# Patient Record
Sex: MALE | Race: BLACK OR AFRICAN AMERICAN | NOT HISPANIC OR LATINO | Employment: FULL TIME | URBAN - METROPOLITAN AREA
[De-identification: names, ages, dates, MRNs, and addresses within clinical notes are randomized per-mention and may not be internally consistent; named-entity substitution may affect disease eponyms.]

---

## 2019-03-05 ENCOUNTER — HOSPITAL ENCOUNTER (OUTPATIENT)
Facility: HOSPITAL | Age: 44
Discharge: LEFT BEFORE DISCHARGE | End: 2019-03-06
Attending: EMERGENCY MEDICINE | Admitting: INTERNAL MEDICINE
Payer: MEDICAID

## 2019-03-05 DIAGNOSIS — T78.3XXA ANGIOEDEMA, INITIAL ENCOUNTER: Primary | ICD-10-CM

## 2019-03-05 DIAGNOSIS — I10 HYPERTENSION, UNSPECIFIED TYPE: ICD-10-CM

## 2019-03-05 DIAGNOSIS — N28.9 RENAL INSUFFICIENCY: ICD-10-CM

## 2019-03-05 DIAGNOSIS — Z72.0 TOBACCO ABUSE: ICD-10-CM

## 2019-03-05 LAB
ALBUMIN SERPL BCP-MCNC: 4 G/DL
ALP SERPL-CCNC: 62 U/L
ALT SERPL W/O P-5'-P-CCNC: 17 U/L
ANION GAP SERPL CALC-SCNC: 11 MMOL/L
APTT BLDCRRT: 31.3 SEC
AST SERPL-CCNC: 22 U/L
BASOPHILS # BLD AUTO: 0.05 K/UL
BASOPHILS NFR BLD: 0.7 %
BILIRUB SERPL-MCNC: 0.3 MG/DL
BUN SERPL-MCNC: 23 MG/DL
CALCIUM SERPL-MCNC: 9.9 MG/DL
CHLORIDE SERPL-SCNC: 105 MMOL/L
CO2 SERPL-SCNC: 24 MMOL/L
CREAT SERPL-MCNC: 2.3 MG/DL
DIFFERENTIAL METHOD: NORMAL
EOSINOPHIL # BLD AUTO: 0.1 K/UL
EOSINOPHIL NFR BLD: 1.8 %
ERYTHROCYTE [DISTWIDTH] IN BLOOD BY AUTOMATED COUNT: 14.2 %
EST. GFR  (AFRICAN AMERICAN): 38 ML/MIN/1.73 M^2
EST. GFR  (NON AFRICAN AMERICAN): 33 ML/MIN/1.73 M^2
GLUCOSE SERPL-MCNC: 76 MG/DL
HCT VFR BLD AUTO: 45.5 %
HGB BLD-MCNC: 14.8 G/DL
INR PPP: 1
LYMPHOCYTES # BLD AUTO: 2 K/UL
LYMPHOCYTES NFR BLD: 27.3 %
MCH RBC QN AUTO: 29.8 PG
MCHC RBC AUTO-ENTMCNC: 32.5 G/DL
MCV RBC AUTO: 92 FL
MONOCYTES # BLD AUTO: 0.7 K/UL
MONOCYTES NFR BLD: 8.9 %
NEUTROPHILS # BLD AUTO: 4.5 K/UL
NEUTROPHILS NFR BLD: 61.3 %
PLATELET # BLD AUTO: 252 K/UL
PMV BLD AUTO: 11 FL
POTASSIUM SERPL-SCNC: 3.9 MMOL/L
PROT SERPL-MCNC: 7.5 G/DL
PROTHROMBIN TIME: 10.4 SEC
RBC # BLD AUTO: 4.97 M/UL
SODIUM SERPL-SCNC: 140 MMOL/L
WBC # BLD AUTO: 7.33 K/UL

## 2019-03-05 PROCEDURE — 25000003 PHARM REV CODE 250: Performed by: EMERGENCY MEDICINE

## 2019-03-05 PROCEDURE — 96374 THER/PROPH/DIAG INJ IV PUSH: CPT

## 2019-03-05 PROCEDURE — G0378 HOSPITAL OBSERVATION PER HR: HCPCS

## 2019-03-05 PROCEDURE — 85610 PROTHROMBIN TIME: CPT

## 2019-03-05 PROCEDURE — 99285 EMERGENCY DEPT VISIT HI MDM: CPT | Mod: 25

## 2019-03-05 PROCEDURE — 81003 URINALYSIS AUTO W/O SCOPE: CPT

## 2019-03-05 PROCEDURE — 85025 COMPLETE CBC W/AUTO DIFF WBC: CPT

## 2019-03-05 PROCEDURE — 25000003 PHARM REV CODE 250: Performed by: NURSE PRACTITIONER

## 2019-03-05 PROCEDURE — 63600175 PHARM REV CODE 636 W HCPCS: Performed by: NURSE PRACTITIONER

## 2019-03-05 PROCEDURE — 63600175 PHARM REV CODE 636 W HCPCS: Performed by: EMERGENCY MEDICINE

## 2019-03-05 PROCEDURE — 96375 TX/PRO/DX INJ NEW DRUG ADDON: CPT

## 2019-03-05 PROCEDURE — 85730 THROMBOPLASTIN TIME PARTIAL: CPT

## 2019-03-05 PROCEDURE — S0028 INJECTION, FAMOTIDINE, 20 MG: HCPCS | Performed by: NURSE PRACTITIONER

## 2019-03-05 PROCEDURE — 80053 COMPREHEN METABOLIC PANEL: CPT

## 2019-03-05 RX ORDER — METHYLPREDNISOLONE SOD SUCC 125 MG
125 VIAL (EA) INJECTION
Status: COMPLETED | OUTPATIENT
Start: 2019-03-05 | End: 2019-03-05

## 2019-03-05 RX ORDER — DEXAMETHASONE SODIUM PHOSPHATE 4 MG/ML
12 INJECTION, SOLUTION INTRA-ARTICULAR; INTRALESIONAL; INTRAMUSCULAR; INTRAVENOUS; SOFT TISSUE EVERY 12 HOURS
Status: DISCONTINUED | OUTPATIENT
Start: 2019-03-06 | End: 2019-03-06

## 2019-03-05 RX ORDER — LISINOPRIL 40 MG/1
40 TABLET ORAL DAILY
Status: ON HOLD | COMMUNITY
End: 2019-03-06 | Stop reason: HOSPADM

## 2019-03-05 RX ORDER — FAMOTIDINE 10 MG/ML
20 INJECTION INTRAVENOUS DAILY
Status: DISCONTINUED | OUTPATIENT
Start: 2019-03-06 | End: 2019-03-07 | Stop reason: HOSPADM

## 2019-03-05 RX ORDER — CLONIDINE HYDROCHLORIDE 0.1 MG/1
0.1 TABLET ORAL EVERY 6 HOURS PRN
Status: DISCONTINUED | OUTPATIENT
Start: 2019-03-05 | End: 2019-03-05

## 2019-03-05 RX ORDER — DIPHENHYDRAMINE HYDROCHLORIDE 50 MG/ML
50 INJECTION INTRAMUSCULAR; INTRAVENOUS
Status: COMPLETED | OUTPATIENT
Start: 2019-03-05 | End: 2019-03-05

## 2019-03-05 RX ORDER — DIPHENHYDRAMINE HYDROCHLORIDE 50 MG/ML
25 INJECTION INTRAMUSCULAR; INTRAVENOUS
Status: DISCONTINUED | OUTPATIENT
Start: 2019-03-05 | End: 2019-03-05

## 2019-03-05 RX ORDER — DIPHENHYDRAMINE HYDROCHLORIDE 50 MG/ML
50 INJECTION INTRAMUSCULAR; INTRAVENOUS EVERY 6 HOURS
Status: DISCONTINUED | OUTPATIENT
Start: 2019-03-06 | End: 2019-03-07 | Stop reason: HOSPADM

## 2019-03-05 RX ORDER — VERAPAMIL HYDROCHLORIDE 40 MG/1
40 TABLET ORAL 3 TIMES DAILY
Status: DISCONTINUED | OUTPATIENT
Start: 2019-03-06 | End: 2019-03-06

## 2019-03-05 RX ORDER — DEXAMETHASONE SODIUM PHOSPHATE 4 MG/ML
12 INJECTION, SOLUTION INTRA-ARTICULAR; INTRALESIONAL; INTRAMUSCULAR; INTRAVENOUS; SOFT TISSUE
Status: COMPLETED | OUTPATIENT
Start: 2019-03-05 | End: 2019-03-05

## 2019-03-05 RX ORDER — SODIUM CHLORIDE 0.9 % (FLUSH) 0.9 %
5 SYRINGE (ML) INJECTION
Status: DISCONTINUED | OUTPATIENT
Start: 2019-03-05 | End: 2019-03-07 | Stop reason: HOSPADM

## 2019-03-05 RX ORDER — HYDRALAZINE HYDROCHLORIDE 20 MG/ML
10 INJECTION INTRAMUSCULAR; INTRAVENOUS EVERY 8 HOURS PRN
Status: DISCONTINUED | OUTPATIENT
Start: 2019-03-05 | End: 2019-03-07 | Stop reason: HOSPADM

## 2019-03-05 RX ORDER — FAMOTIDINE 10 MG/ML
20 INJECTION INTRAVENOUS
Status: COMPLETED | OUTPATIENT
Start: 2019-03-05 | End: 2019-03-05

## 2019-03-05 RX ORDER — FAMOTIDINE 10 MG/ML
20 INJECTION INTRAVENOUS 2 TIMES DAILY
Status: DISCONTINUED | OUTPATIENT
Start: 2019-03-06 | End: 2019-03-05

## 2019-03-05 RX ORDER — ACETAMINOPHEN 500 MG
500 TABLET ORAL EVERY 6 HOURS PRN
Status: DISCONTINUED | OUTPATIENT
Start: 2019-03-05 | End: 2019-03-07 | Stop reason: HOSPADM

## 2019-03-05 RX ORDER — HYDRALAZINE HYDROCHLORIDE 20 MG/ML
10 INJECTION INTRAMUSCULAR; INTRAVENOUS
Status: COMPLETED | OUTPATIENT
Start: 2019-03-05 | End: 2019-03-05

## 2019-03-05 RX ADMIN — SODIUM CHLORIDE 1000 ML: 0.9 INJECTION, SOLUTION INTRAVENOUS at 10:03

## 2019-03-05 RX ADMIN — DEXAMETHASONE SODIUM PHOSPHATE 12 MG: 4 INJECTION, SOLUTION INTRAMUSCULAR; INTRAVENOUS at 08:03

## 2019-03-05 RX ADMIN — DIPHENHYDRAMINE HYDROCHLORIDE 50 MG: 50 INJECTION INTRAMUSCULAR; INTRAVENOUS at 08:03

## 2019-03-05 RX ADMIN — HYDRALAZINE HYDROCHLORIDE 10 MG: 20 INJECTION INTRAMUSCULAR; INTRAVENOUS at 08:03

## 2019-03-05 RX ADMIN — FAMOTIDINE 20 MG: 10 INJECTION INTRAVENOUS at 08:03

## 2019-03-05 RX ADMIN — METHYLPREDNISOLONE SODIUM SUCCINATE 125 MG: 125 INJECTION, POWDER, FOR SOLUTION INTRAMUSCULAR; INTRAVENOUS at 08:03

## 2019-03-06 VITALS
HEIGHT: 75 IN | RESPIRATION RATE: 18 BRPM | OXYGEN SATURATION: 98 % | SYSTOLIC BLOOD PRESSURE: 188 MMHG | BODY MASS INDEX: 25.08 KG/M2 | TEMPERATURE: 98 F | DIASTOLIC BLOOD PRESSURE: 121 MMHG | HEART RATE: 82 BPM | WEIGHT: 201.75 LBS

## 2019-03-06 LAB
BILIRUB UR QL STRIP: NEGATIVE
CLARITY UR: CLEAR
COLOR UR: COLORLESS
GLUCOSE UR QL STRIP: NEGATIVE
HGB UR QL STRIP: NEGATIVE
KETONES UR QL STRIP: NEGATIVE
LEUKOCYTE ESTERASE UR QL STRIP: NEGATIVE
NITRITE UR QL STRIP: NEGATIVE
PH UR STRIP: 6 [PH] (ref 5–8)
PROT UR QL STRIP: NEGATIVE
SP GR UR STRIP: 1 (ref 1–1.03)
URN SPEC COLLECT METH UR: ABNORMAL
UROBILINOGEN UR STRIP-ACNC: NEGATIVE EU/DL

## 2019-03-06 PROCEDURE — S0028 INJECTION, FAMOTIDINE, 20 MG: HCPCS | Performed by: PHYSICIAN ASSISTANT

## 2019-03-06 PROCEDURE — 63600175 PHARM REV CODE 636 W HCPCS: Performed by: PHYSICIAN ASSISTANT

## 2019-03-06 PROCEDURE — G0378 HOSPITAL OBSERVATION PER HR: HCPCS

## 2019-03-06 PROCEDURE — 25000003 PHARM REV CODE 250: Performed by: PHYSICIAN ASSISTANT

## 2019-03-06 PROCEDURE — 63600175 PHARM REV CODE 636 W HCPCS: Performed by: EMERGENCY MEDICINE

## 2019-03-06 PROCEDURE — 96376 TX/PRO/DX INJ SAME DRUG ADON: CPT | Performed by: EMERGENCY MEDICINE

## 2019-03-06 PROCEDURE — 25000003 PHARM REV CODE 250: Performed by: NURSE PRACTITIONER

## 2019-03-06 PROCEDURE — 63600175 PHARM REV CODE 636 W HCPCS: Performed by: NURSE PRACTITIONER

## 2019-03-06 RX ORDER — CLONIDINE HYDROCHLORIDE 0.1 MG/1
0.2 TABLET ORAL ONCE
Status: COMPLETED | OUTPATIENT
Start: 2019-03-06 | End: 2019-03-06

## 2019-03-06 RX ORDER — VERAPAMIL HYDROCHLORIDE 120 MG/1
120 TABLET, FILM COATED, EXTENDED RELEASE ORAL DAILY
Status: DISCONTINUED | OUTPATIENT
Start: 2019-03-06 | End: 2019-03-06

## 2019-03-06 RX ORDER — HYDRALAZINE HYDROCHLORIDE 20 MG/ML
10 INJECTION INTRAMUSCULAR; INTRAVENOUS ONCE
Status: COMPLETED | OUTPATIENT
Start: 2019-03-06 | End: 2019-03-06

## 2019-03-06 RX ORDER — VERAPAMIL HYDROCHLORIDE 120 MG/1
120 TABLET, FILM COATED, EXTENDED RELEASE ORAL DAILY
Qty: 30 TABLET | Refills: 3 | Status: ON HOLD | OUTPATIENT
Start: 2019-03-07 | End: 2019-11-29 | Stop reason: HOSPADM

## 2019-03-06 RX ORDER — HYDRALAZINE HYDROCHLORIDE 25 MG/1
50 TABLET, FILM COATED ORAL EVERY 8 HOURS
Status: DISCONTINUED | OUTPATIENT
Start: 2019-03-06 | End: 2019-03-07 | Stop reason: HOSPADM

## 2019-03-06 RX ORDER — CETIRIZINE HYDROCHLORIDE 10 MG/1
10 TABLET ORAL DAILY
Refills: 0 | Status: ON HOLD | COMMUNITY
Start: 2019-03-06 | End: 2019-11-29 | Stop reason: HOSPADM

## 2019-03-06 RX ORDER — HYDRALAZINE HYDROCHLORIDE 25 MG/1
25 TABLET, FILM COATED ORAL EVERY 8 HOURS
Status: DISCONTINUED | OUTPATIENT
Start: 2019-03-06 | End: 2019-03-06

## 2019-03-06 RX ORDER — HYDRALAZINE HYDROCHLORIDE 50 MG/1
50 TABLET, FILM COATED ORAL 3 TIMES DAILY
Qty: 90 TABLET | Refills: 1 | Status: ON HOLD | OUTPATIENT
Start: 2019-03-06 | End: 2019-11-29 | Stop reason: HOSPADM

## 2019-03-06 RX ORDER — HYDRALAZINE HYDROCHLORIDE 25 MG/1
25 TABLET, FILM COATED ORAL EVERY 12 HOURS
Status: DISCONTINUED | OUTPATIENT
Start: 2019-03-06 | End: 2019-03-06

## 2019-03-06 RX ORDER — VERAPAMIL HYDROCHLORIDE 120 MG/1
120 TABLET, FILM COATED, EXTENDED RELEASE ORAL DAILY
Status: DISCONTINUED | OUTPATIENT
Start: 2019-03-06 | End: 2019-03-07 | Stop reason: HOSPADM

## 2019-03-06 RX ORDER — LABETALOL HYDROCHLORIDE 5 MG/ML
20 INJECTION, SOLUTION INTRAVENOUS ONCE
Status: DISCONTINUED | OUTPATIENT
Start: 2019-03-06 | End: 2019-03-06

## 2019-03-06 RX ORDER — PREDNISONE 10 MG/1
TABLET ORAL
Qty: 14 TABLET | Refills: 0 | Status: SHIPPED | OUTPATIENT
Start: 2019-03-06 | End: 2019-03-26

## 2019-03-06 RX ADMIN — DIPHENHYDRAMINE HYDROCHLORIDE 50 MG: 50 INJECTION, SOLUTION INTRAMUSCULAR; INTRAVENOUS at 06:03

## 2019-03-06 RX ADMIN — ACETAMINOPHEN 500 MG: 500 TABLET, FILM COATED ORAL at 12:03

## 2019-03-06 RX ADMIN — CLONIDINE HYDROCHLORIDE 0.2 MG: 0.1 TABLET ORAL at 02:03

## 2019-03-06 RX ADMIN — HYDRALAZINE HYDROCHLORIDE 10 MG: 20 INJECTION INTRAMUSCULAR; INTRAVENOUS at 12:03

## 2019-03-06 RX ADMIN — FAMOTIDINE 20 MG: 10 INJECTION INTRAVENOUS at 09:03

## 2019-03-06 RX ADMIN — CLONIDINE HYDROCHLORIDE 0.2 MG: 0.1 TABLET ORAL at 04:03

## 2019-03-06 RX ADMIN — VERAPAMIL HYDROCHLORIDE 120 MG: 120 TABLET, FILM COATED, EXTENDED RELEASE ORAL at 09:03

## 2019-03-06 RX ADMIN — HYDRALAZINE HYDROCHLORIDE 10 MG: 20 INJECTION INTRAMUSCULAR; INTRAVENOUS at 04:03

## 2019-03-06 RX ADMIN — DIPHENHYDRAMINE HYDROCHLORIDE 50 MG: 50 INJECTION, SOLUTION INTRAMUSCULAR; INTRAVENOUS at 01:03

## 2019-03-06 RX ADMIN — DEXAMETHASONE SODIUM PHOSPHATE 12 MG: 4 INJECTION, SOLUTION INTRAMUSCULAR; INTRAVENOUS at 06:03

## 2019-03-06 RX ADMIN — HYDRALAZINE HYDROCHLORIDE 10 MG: 20 INJECTION INTRAMUSCULAR; INTRAVENOUS at 06:03

## 2019-03-06 NOTE — HPI
Timo Youssef 43 y.o. male with HTN and renal insuffeciency presents to the ED with a chief complaint of lower lip swelling. He reports today at 4:30pm he began to experience lower lip swelling. He had just finished eating a Filet of fish sandwhich from [employer]. He has eaten shellfish before in the past without issue. He has been taking lisinopril for 5 years, but his dose was recently adjusted. He reports he was also bumped in the mouth while [place] today. He denies any bleeding from the event and reports minimal swelling at that time. He endorses lip swelling. He denies chest pain, fever, SOB, drooling, difficulty speaking, N/V/D, abdominal pain, leg swelling, wheezing and syncope. He reports he is a current 1/2 ppd smoker, occasionally uses alcohol, and denies recreational drug use.    His fiance reports his edema has not worsened, and has remained approximately the same since it started. He has never had difficulty speaking, nor wheezing, drooling, or SOB.     In the ED, Cr of 2.2, lower lip swelling, H&H 14.8&45.4, no WBC.

## 2019-03-06 NOTE — PROGRESS NOTES
[facility] Hospital Medicine  Progress Note    Patient Name: Timo Youssef  MRN: [id number]  Patient Class: OP- Observation   Admission Date: 3/5/2019  Length of Stay: 0 days  Attending Physician: Margie Soto MD  Primary Care Provider: Primary Doctor No        Subjective:     Principal Problem:Hypertension    HPI:  Timo Youssef 43 y.o. male with HTN and renal insuffeciency presents to the ED with a chief complaint of lower lip swelling. He reports today at 4:30pm he began to experience lower lip swelling. He had just finished eating a Filet of fish [employer] from [employer]. He has eaten shellfish before in the past without issue. He has been taking lisinopril for 5 years, but his dose was recently adjusted. He reports he was also bumped in the mouth while [place] today. He denies any bleeding from the event and reports minimal swelling at that time. He endorses lip swelling. He denies chest pain, fever, SOB, drooling, difficulty speaking, N/V/D, abdominal pain, leg swelling, wheezing and syncope. He reports he is a current 1/2 ppd smoker, occasionally uses alcohol, and denies recreational drug use.    His fiance reports his edema has not worsened, and has remained approximately the same since it started. He has never had difficulty speaking, nor wheezing, drooling, or SOB.     In the ED, Cr of 2.2, lower lip swelling, H&H 14.8&45.4, no WBC.     Hospital Course:  Timo Youssef 43 y.o. male admitted to observation for management of angio-edema due to lisinopril. Patient also noted to have uncontrolled blood pressure and CKD due to non adherence to medications. UA no proteinuria and does not see a Nephrologist. Low lip edema significantly improved overnight with steroid, H1, and H2. Patient tolerated regular diet without issues. Verapamil 40 bid switch to varapamil  mg daily and hydralazine 25 mg TID added.  Encourage smoking cessation, low salt diet and  compliance with medications.  NO ACEI or ARBS.       Interval History: Low lip swelling improved. Uncontrolled BP -190's.     Review of Systems   Constitutional: Negative for chills and fever.   HENT: Negative for drooling, nosebleeds, tinnitus, trouble swallowing and voice change.         Lip swelling   Eyes: Negative for photophobia and visual disturbance.   Respiratory: Negative for shortness of breath, wheezing and stridor.    Cardiovascular: Negative for chest pain, palpitations and leg swelling.   Gastrointestinal: Negative for abdominal distention, nausea and vomiting.   Genitourinary: Negative for dysuria, flank pain and hematuria.   Musculoskeletal: Negative for gait problem and joint swelling.   Skin: Negative for rash and wound.   Neurological: Negative for seizures, syncope and speech difficulty.     Objective:     Vital Signs (Most Recent):  Temp: 98.9 °F (37.2 °C) (03/06/19 1601)  Pulse: 94 (03/06/19 1601)  Resp: 18 (03/06/19 1601)  BP: (!) 189/118 (03/06/19 1601)  SpO2: 98 % (03/06/19 1601) Vital Signs (24h Range):  Temp:  [98.1 °F (36.7 °C)-99.1 °F (37.3 °C)] 98.9 °F (37.2 °C)  Pulse:  [72-97] 94  Resp:  [18] 18  SpO2:  [94 %-100 %] 98 %  BP: (152-194)/() 189/118     Weight: 91.5 kg (201 lb 11.5 oz)  Body mass index is 25.55 kg/m².  No intake or output data in the 24 hours ending 03/06/19 1615   Physical Exam   Constitutional: He is oriented to person, place, and time. He appears well-developed and well-nourished. No distress.   HENT:   Head: Normocephalic and atraumatic.   Right Ear: External ear normal.   Left Ear: External ear normal.   Lower lip swelling significant improvement close to baseline. No involvement of tongue or posterior oropharynx. No drooling or stridor. Patient protecting airway and speaking in full sentences   Eyes: Conjunctivae and EOM are normal. Right eye exhibits no discharge. Left eye exhibits no discharge.   Neck: Normal range of motion. No thyromegaly present.    Cardiovascular: Normal rate and regular rhythm.   No murmur heard.  Pulmonary/Chest: Effort normal and breath sounds normal. No respiratory distress.   Abdominal: Soft. Bowel sounds are normal. He exhibits no distension and no mass. There is no tenderness.   Musculoskeletal: He exhibits no edema or deformity.   Neurological: He is alert and oriented to person, place, and time.   Skin: Skin is warm and dry.   Psychiatric: He has a normal mood and affect. His behavior is normal.   Nursing note and vitals reviewed.      Significant Labs: All pertinent labs within the past 24 hours have been reviewed.    Significant Imaging: I have reviewed and interpreted all pertinent imaging results/findings within the past 24 hours.    Assessment/Plan:      * Hypertension    Poorly controlled. Switch to verapamil 120 mg daily and add hydralazine 25 mg TID.    Prn hydralazine IV.   BP remains elevated despite hydralazine IV and clonidine 0.2 mg x 1. Give labetolol 20 mg IV x 1 now and reassess in 1 hr. Titrate oral hydralazine up accordingly.         Tobacco abuse    Greater than 3 minutes spent counseling patient on dangers of continued tobacco abuse. Will provide tobacco cessation education.     Renal insufficiency    -Appears to be baseline. Per care everywhere chart review Cr of 2.25 in 5/2016. Cr today of 2.3. Will hold lisinopril given likely cause of agnioedema. Renal dose medications. Avoid nephrotoxic drugs.  -UA no proteinuria   -PCP to refer to Nephrology outpatient     Angio-edema    Resolved. Lisnopril listed as allergy. Continue prednisone taper and zyrtec x 6 days.        VTE Risk Mitigation (From admission, onward)        Ordered     Place HARSHIL hose  Until discontinued      03/05/19 2148     IP VTE LOW RISK PATIENT  Once      03/05/19 2146     Place sequential compression device  Until discontinued      03/05/19 2146              Peyton Sorenson NP  Department of Hospital Medicine   [facility]

## 2019-03-06 NOTE — SUBJECTIVE & OBJECTIVE
Past Medical History:   Diagnosis Date    Hypertension        Past Surgical History:   Procedure Laterality Date    BRAIN SURGERY         Review of patient's allergies indicates:  No Known Allergies    No current facility-administered medications on file prior to encounter.      Current Outpatient Medications on File Prior to Encounter   Medication Sig    lisinopril (PRINIVIL,ZESTRIL) 40 MG tablet Take 40 mg by mouth once daily.     Family History     None        Tobacco Use    Smoking status: Current Every Day Smoker     Types: Cigars   Substance and Sexual Activity    Alcohol use: Yes    Drug use: No    Sexual activity: Not on file     Review of Systems   Constitutional: Negative for chills and fever.   HENT: Negative for drooling, nosebleeds, tinnitus, trouble swallowing and voice change.         Lip swelling   Eyes: Negative for photophobia and visual disturbance.   Respiratory: Negative for shortness of breath, wheezing and stridor.    Cardiovascular: Negative for chest pain, palpitations and leg swelling.   Gastrointestinal: Negative for abdominal distention, nausea and vomiting.   Genitourinary: Negative for dysuria, flank pain and hematuria.   Musculoskeletal: Negative for gait problem and joint swelling.   Skin: Negative for rash and wound.   Neurological: Negative for seizures, syncope and speech difficulty.     Objective:     Vital Signs (Most Recent):  Temp: 98.7 °F (37.1 °C) (03/05/19 1934)  Pulse: 76 (03/05/19 2131)  Resp: 18 (03/05/19 1934)  BP: (!) 166/103 (03/05/19 2131)  SpO2: 100 % (03/05/19 2131) Vital Signs (24h Range):  Temp:  [98.7 °F (37.1 °C)] 98.7 °F (37.1 °C)  Pulse:  [72-87] 76  Resp:  [18] 18  SpO2:  [98 %-100 %] 100 %  BP: (166-193)/(103-125) 166/103     Weight: 99.8 kg (220 lb)  Body mass index is 27.87 kg/m².    Physical Exam   Constitutional: He is oriented to person, place, and time. He appears well-developed and well-nourished. No distress.   HENT:   Head: Normocephalic and  atraumatic.   Right Ear: External ear normal.   Left Ear: External ear normal.   Lower lip swelling. No involvement of tongue or posterior oropharynx. No drooling or stridor. Patient protecting airway and speaking in full sentences   Eyes: Conjunctivae and EOM are normal. Right eye exhibits no discharge. Left eye exhibits no discharge.   Neck: Normal range of motion. No thyromegaly present.   Cardiovascular: Normal rate and regular rhythm.   No murmur heard.  Pulmonary/Chest: Effort normal and breath sounds normal. No respiratory distress.   Abdominal: Soft. Bowel sounds are normal. He exhibits no distension and no mass. There is no tenderness.   Musculoskeletal: He exhibits no edema or deformity.   Neurological: He is alert and oriented to person, place, and time.   Skin: Skin is warm and dry.   Psychiatric: He has a normal mood and affect. His behavior is normal.   Nursing note and vitals reviewed.        CRANIAL NERVES     CN III, IV, VI   Extraocular motions are normal.        Significant Labs:   CBC:   Recent Labs   Lab 03/05/19 1940   WBC 7.33   HGB 14.8   HCT 45.5        CMP:   Recent Labs   Lab 03/05/19 1940      K 3.9      CO2 24   GLU 76   BUN 23*   CREATININE 2.3*   CALCIUM 9.9   PROT 7.5   ALBUMIN 4.0   BILITOT 0.3   ALKPHOS 62   AST 22   ALT 17   ANIONGAP 11   EGFRNONAA 33*     Coagulation:   Recent Labs   Lab 03/05/19 1940   INR 1.0   APTT 31.3       Significant Imaging: I have reviewed all pertinent imaging results/findings within the past 24 hours.

## 2019-03-06 NOTE — ASSESSMENT & PLAN NOTE
Greater than 3 minutes spent counseling patient on dangers of continued tobacco abuse. Will provide tobacco cessation education.

## 2019-03-06 NOTE — SUBJECTIVE & OBJECTIVE
Interval History: Low lip swelling improved. Uncontrolled BP -190's.     Review of Systems   Constitutional: Negative for chills and fever.   HENT: Negative for drooling, nosebleeds, tinnitus, trouble swallowing and voice change.         Lip swelling   Eyes: Negative for photophobia and visual disturbance.   Respiratory: Negative for shortness of breath, wheezing and stridor.    Cardiovascular: Negative for chest pain, palpitations and leg swelling.   Gastrointestinal: Negative for abdominal distention, nausea and vomiting.   Genitourinary: Negative for dysuria, flank pain and hematuria.   Musculoskeletal: Negative for gait problem and joint swelling.   Skin: Negative for rash and wound.   Neurological: Negative for seizures, syncope and speech difficulty.     Objective:     Vital Signs (Most Recent):  Temp: 98.9 °F (37.2 °C) (03/06/19 1601)  Pulse: 94 (03/06/19 1601)  Resp: 18 (03/06/19 1601)  BP: (!) 189/118 (03/06/19 1601)  SpO2: 98 % (03/06/19 1601) Vital Signs (24h Range):  Temp:  [98.1 °F (36.7 °C)-99.1 °F (37.3 °C)] 98.9 °F (37.2 °C)  Pulse:  [72-97] 94  Resp:  [18] 18  SpO2:  [94 %-100 %] 98 %  BP: (152-194)/() 189/118     Weight: 91.5 kg (201 lb 11.5 oz)  Body mass index is 25.55 kg/m².  No intake or output data in the 24 hours ending 03/06/19 1615   Physical Exam   Constitutional: He is oriented to person, place, and time. He appears well-developed and well-nourished. No distress.   HENT:   Head: Normocephalic and atraumatic.   Right Ear: External ear normal.   Left Ear: External ear normal.   Lower lip swelling significant improvement close to baseline. No involvement of tongue or posterior oropharynx. No drooling or stridor. Patient protecting airway and speaking in full sentences   Eyes: Conjunctivae and EOM are normal. Right eye exhibits no discharge. Left eye exhibits no discharge.   Neck: Normal range of motion. No thyromegaly present.   Cardiovascular: Normal rate and regular rhythm.    No murmur heard.  Pulmonary/Chest: Effort normal and breath sounds normal. No respiratory distress.   Abdominal: Soft. Bowel sounds are normal. He exhibits no distension and no mass. There is no tenderness.   Musculoskeletal: He exhibits no edema or deformity.   Neurological: He is alert and oriented to person, place, and time.   Skin: Skin is warm and dry.   Psychiatric: He has a normal mood and affect. His behavior is normal.   Nursing note and vitals reviewed.      Significant Labs: All pertinent labs within the past 24 hours have been reviewed.    Significant Imaging: I have reviewed and interpreted all pertinent imaging results/findings within the past 24 hours.

## 2019-03-06 NOTE — PLAN OF CARE
Problem: Fall Injury Risk  Goal: Absence of Fall and Fall-Related Injury    Intervention: Identify and Manage Contributors to Fall Injury Risk   03/06/19 0136   Manage Acute Allergic Reaction   Medication Review/Management medications reviewed;high risk medications identified   Identify and Manage Contributors to Fall Injury Risk   Self-Care Promotion independence encouraged     Intervention: Promote Injury-Free Environment   03/06/19 0136   Optimize [place] and Functional Mobility   Environmental Safety Modification clutter free environment maintained;lighting adjusted   Optimize Balance and Safe Activity   Safety Promotion/Fall Prevention Fall Risk signage in place;high risk medications identified;nonskid shoes/socks when out of bed;side rails raised x 2         Problem: Allergic/Anaphylactic Reaction  Goal: Resolution of Hypersensitivity Symptoms    Intervention: Manage Acute Allergic Reaction   03/06/19 0136   Manage Acute Allergic Reaction   Medication Review/Management medications reviewed;high risk medications identified         Comments: Pt had no c/o discomfort, but swelling noted to bottom lip that decreased in size. Pt was NSR on monitor and BP elevated, but Sys less than 170.Pt BP at 0616 was 190/110 and received hydralazine will reassess. Pt free of falls this shift and rested comfortably at bedside.

## 2019-03-06 NOTE — PLAN OF CARE
"TN reviewed follow up appointment information as well as  "Hypertension discharge instructions" handout with patient using teach back. Patient stated he will notify the doctor if he has dizziness, nosebleeds and change of vision. Patient is in agreement and verbalized an understanding. Placed discharge information in blue discharge folder.  TN also reviewed patient responsibility checklist with him using teach back. Patient was able to verbalize his responsibilities after discharge to manage his care at home bein. Going to follow up appointments   2. Picking up rx from the pharmacy when discharged  3. Taking his medications as prescribed     Patient informed to contact [facility] to schedule his PCP appt. Patient verbalized understanding.      Patient's nurse, Daljit, informed that patient can discharge from  standpoint.        19 1256   Final Note   Assessment Type Final Discharge Note   Anticipated Discharge Disposition Home   What phone number can be called within the next 1-3 days to see how you are doing after discharge? ([phone])   Hospital Follow Up  Appt(s) scheduled? Yes   Discharge plans and expectations educations in teach back method with documentation complete? Yes   Right Care Referral Info   Post Acute Recommendation No Care     "

## 2019-03-06 NOTE — PROGRESS NOTES
WRITTEN HEALTHCARE DISCHARGE INFORMATION     Things that YOU are responsible for to Manage Your Care At Home:  1. Getting your prescriptions filled.  2. Taking you medications as directed. DO NOT MISS ANY DOSES!  3. Going to your follow-up doctor appointments. This is important because it allows the doctor to monitor your progress and to determine if any changes need to be made to your treatment plan.    If you are unable to make your follow up appointments, please call the number listed and reschedule this appointment.     After discharge, if you need assistance, you can call [facility] for 24/7 assistance at [phone]    Thank you for choosing [facility] and allowing us to care for you.     You should receive a call from [facility] within 48-72 hours to help manage your care after discharge. Please try to make sure that you answer your phone for this important phone call.     Follow-up Information     [facility] In 1 week.    Why:  Please call to schedule your PCP appt in 1 week   Contact information:  [address] [place] [address]  [phone]

## 2019-03-06 NOTE — ASSESSMENT & PLAN NOTE
Poorly controlled. Will continue patient's home Verapamil. He reports he takes 40mg TID.   Prn hydralazine.  Stopping lisinopril due to angioedema

## 2019-03-06 NOTE — ASSESSMENT & PLAN NOTE
Poorly controlled. Switch to verapamil 120 mg daily and add hydralazine 25 mg TID.    Prn hydralazine IV.   BP remains elevated despite hydralazine IV and clonidine 0.2 mg x 1. Give labetolol 20 mg IV x 1 now and reassess in 1 hr. Titrate oral hydralazine up accordingly.

## 2019-03-06 NOTE — ED PROVIDER NOTES
Encounter Date: 3/5/2019    This is a SORT/MSE of a 43 y.o. male presenting to the ED with c/o swelling to lower lip. Reports no difficulty breathing or swallowing. Does take lisinopril. Care will be transferred to an alternate provider when patient is roomed for a full evaluation and final disposition. ROLF Olivo, SATYAPSHO 3/5/2019 7:33 PM       History   No chief complaint on file.    43 y.o. male. Htn, renal insufficiency (unknown baseline) on lisinopril for over 5 years but recently increased dose 3 weeks ago. Notes he ate a fish sandwhich from [employer] and an hour later his bottom lip started to swell (4pm) since then it has swelled progressively worse. He has had fish sandwiches from[employer] previously with no reaction. No trouble swallowing/breathing. Has never had this before          Review of patient's allergies indicates:  Allergies not on file  No past medical history on file.  No past surgical history on file.  No family history on file.  Social History     Tobacco Use    Smoking status: Not on file   Substance Use Topics    Alcohol use: Not on file    Drug use: Not on file     Review of Systems   Constitutional: Negative for fever.   HENT: Negative for sore throat.    Respiratory: Negative for shortness of breath.    Cardiovascular: Negative for chest pain.   Gastrointestinal: Negative for nausea.   Genitourinary: Negative for dysuria.   Musculoskeletal: Negative for back pain.   Skin: Negative for rash.   Neurological: Negative for weakness.   Hematological: Does not bruise/bleed easily.   All other systems reviewed and are negative.      Physical Exam     Initial Vitals   BP Pulse Resp Temp SpO2   -- -- -- -- --      MAP       --         Physical Exam    Nursing note and vitals reviewed.  Constitutional: He appears well-developed and well-nourished.   HENT:   Head: Normocephalic and atraumatic.   Eyes: EOM are normal. Pupils are equal, round, and reactive to light.   Cardiovascular: Normal  rate and regular rhythm.   Pulmonary/Chest: Effort normal.   Abdominal: He exhibits no distension.   Musculoskeletal: He exhibits no edema or tenderness.   Neurological: He is alert and oriented to person, place, and time. No cranial nerve deficit.   Skin: Skin is warm and dry.   Psychiatric: He has a normal mood and affect.     bottom lip boggy edematous c/w angioedema  Oropharynx clear, no tongue/posterior phayrnx/uvula involvement    ED Course   Procedures  Labs Reviewed - No data to display       Imaging Results    None                       pt has only lower lip involvement which has not progressed since arrival. He is protecting his airway.. Will place him in obs for continued monitoring.        Clinical Impression:       ICD-10-CM ICD-9-CM   1. Angioedema, initial encounter T78.3XXA 995.1   2. Hypertension, unspecified type I10 401.9   3. Renal insufficiency N28.9 593.9                                Alex Bush MD  03/05/19 2109       Alex Bush MD  03/05/19 2112

## 2019-03-06 NOTE — NURSING
Informed pt that he is under medical supervision and should not leave the unit to smoke due to possible injury while off the unit. Pt stated that as soon as nurse leave the room he will leave unit.
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
## 2019-03-06 NOTE — ASSESSMENT & PLAN NOTE
Appears to be baseline. Per care everywhere chart review Cr of 2.25 in 5/2016. Cr today of 2.3. Will hold lisinopril given likely cause of agnioedema. Renal dose medications. Avoid nephrotoxic drugs. Recheck in AM  -UA pending

## 2019-03-06 NOTE — HOSPITAL COURSE
Timo Youssef 43 y.o. male admitted to observation for management of angio-edema due to lisinopril. Patient also noted to have uncontrolled blood pressure and CKD due to non adherence to medications. UA no proteinuria and does not see a Nephrologist. Low lip edema significantly improved overnight with steroid, H1, and H2. Patient tolerated regular diet without issues. Verapamil 40 bid switch to varapamil  mg daily and hydralazine 50 mg TID added.  Encourage smoking cessation, low salt diet and compliance with medications.  NO ACEI or ARBS. Plan to continue to titrate antihypertensive however patient left against medical advice prior to me seeing patient again. Despite night team PA explaining to patient risk of stroke, heart attack and even death, patient left against medical advice.
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
## 2019-03-06 NOTE — PLAN OF CARE
"TN met with patient at bedside to complete discharge needs assessment. TN explained duties of case management to patient.  TN reviewed  "[facility]", "Discharge Planning Begins on Admission" and discussed "Help at Home". Patient stated he lives at home with his significant other who will assist as needed. TN also discussed his responsibilities to manage his health at home. Patient was informed to leave folder at bedside during hospital stay. Contact information added to white board.    No PCP     Patient Preferred Pharmacy:   [id number] - [place], [place] - [address]  [address] [place] [address]  Phone: [phone] Fax: [phone]       03/06/19 1253   Discharge Assessment   Assessment Type Discharge Planning Assessment   Assessment information obtained from? Patient   Prior to hospitilization cognitive status: Alert/Oriented   Prior to hospitalization functional status: Independent   Current cognitive status: Alert/Oriented   Current Functional Status: Independent   Lives With significant other   Able to Return to Prior Arrangements yes   Is patient able to care for self after discharge? Yes   Who are your caregiver(s) and their phone number(s)? Diony- significant other    Patient's perception of discharge disposition home or selfcare   Readmission Within the Last 30 Days no previous admission in last 30 days   Patient currently being followed by outpatient case management? No   Patient currently receives any other outside agency services? No   Equipment Currently Used at Home none   Do you have any problems affording any of your prescribed medications? No   Is the patient taking medications as prescribed? yes   Does the patient have transportation home? Yes   Transportation Anticipated family or friend will provide   Does the patient receive services at the [facility]? No   Discharge Plan A Home;Home with family   Discharge Plan B Home;Home with family   Patient/Family in " Agreement with Plan yes

## 2019-03-06 NOTE — NURSING
Pt arrived on unit from the ED dept wia w/c accompanied per nurse and family. Pt AAOx4 with no c/o pain w/ swelling to lower lip noted. Telemetry monitor in place. Saline lock in place to site is clear. Pt  Admission assessment in progress, pt informed of md orders, oriented to environment and safety maintained with bed low side rails up x2 with nurse call bell within reach

## 2019-03-07 NOTE — NURSING
"Pt c/o blood pressure continuing to be elevated. Pt stated, "It don't make any sense that ya'll keep me. I know what I need, I just need to go home and take my lisinopril." JULIO Kimbrough explained importance of remaining in the hospital to be observed and why lisinopril is not being administered while in the hospital. However pt continued to state, "I just want to go home." Pt signed AMA form, however JULIO Kimbrough provided pt with prescriptions to be filled. PIV removed with catheter intact and tele monitor disconnected. Pt appears to be in no current distress. Pt ambulated off unit with significant other at pt's side.   "
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
## 2019-11-26 ENCOUNTER — HOSPITAL ENCOUNTER (INPATIENT)
Facility: HOSPITAL | Age: 44
LOS: 3 days | Discharge: HOME | DRG: 291 | End: 2019-11-29
Attending: EMERGENCY MEDICINE | Admitting: INTERNAL MEDICINE

## 2019-11-26 DIAGNOSIS — I50.9 HEART FAILURE, UNSPECIFIED HF CHRONICITY, UNSPECIFIED HEART FAILURE TYPE: Primary | ICD-10-CM

## 2019-11-26 DIAGNOSIS — R06.02 SHORTNESS OF BREATH: ICD-10-CM

## 2019-11-26 DIAGNOSIS — N18.9 ACUTE RENAL FAILURE SUPERIMPOSED ON CHRONIC KIDNEY DISEASE, UNSPECIFIED CKD STAGE, UNSPECIFIED ACUTE RENAL FAILURE TYPE: ICD-10-CM

## 2019-11-26 DIAGNOSIS — R06.02 SOB (SHORTNESS OF BREATH): ICD-10-CM

## 2019-11-26 DIAGNOSIS — N17.9 ACUTE RENAL FAILURE SUPERIMPOSED ON CHRONIC KIDNEY DISEASE, UNSPECIFIED CKD STAGE, UNSPECIFIED ACUTE RENAL FAILURE TYPE: ICD-10-CM

## 2019-11-26 DIAGNOSIS — I50.9 HEART FAILURE: ICD-10-CM

## 2019-11-26 PROBLEM — R93.89 ABNORMAL CHEST X-RAY: Status: ACTIVE | Noted: 2019-11-26

## 2019-11-26 PROBLEM — I10 ESSENTIAL HYPERTENSION: Status: ACTIVE | Noted: 2019-11-26

## 2019-11-26 PROBLEM — R79.89 ELEVATED TROPONIN I LEVEL: Status: ACTIVE | Noted: 2019-11-26

## 2019-11-26 PROBLEM — I42.0 DILATED CARDIOMYOPATHY: Status: ACTIVE | Noted: 2019-11-26

## 2019-11-26 PROBLEM — R60.0 LOWER EXTREMITY EDEMA: Status: ACTIVE | Noted: 2019-11-26

## 2019-11-26 PROBLEM — R05.8 NON-PRODUCTIVE COUGH: Status: ACTIVE | Noted: 2019-11-26

## 2019-11-26 PROBLEM — R68.83 CHILLS (WITHOUT FEVER): Status: ACTIVE | Noted: 2019-11-26

## 2019-11-26 PROBLEM — N18.30 CHRONIC KIDNEY DISEASE, STAGE 3: Status: ACTIVE | Noted: 2019-11-26

## 2019-11-26 PROBLEM — F17.200 TOBACCO USE DISORDER, SEVERE, DEPENDENCE: Status: ACTIVE | Noted: 2019-11-26

## 2019-11-26 PROBLEM — B34.9 ACUTE VIRAL SYNDROME: Status: ACTIVE | Noted: 2019-11-26

## 2019-11-26 LAB
ALBUMIN SERPL BCP-MCNC: 3.1 G/DL (ref 3.5–5.2)
ALP SERPL-CCNC: 62 U/L (ref 55–135)
ALT SERPL W/O P-5'-P-CCNC: 33 U/L (ref 10–44)
ANION GAP SERPL CALC-SCNC: 13 MMOL/L (ref 8–16)
AORTIC ROOT ANNULUS: 4.08 CM
AORTIC VALVE CUSP SEPERATION: 2.84 CM
ASCENDING AORTA: 3.66 CM
AST SERPL-CCNC: 31 U/L (ref 10–40)
AV INDEX (PROSTH): 0.59
AV MEAN GRADIENT: 3 MMHG
AV PEAK GRADIENT: 4 MMHG
AV VALVE AREA: 3.13 CM2
AV VELOCITY RATIO: 0.62
BACTERIA #/AREA URNS HPF: NORMAL /HPF
BASOPHILS # BLD AUTO: 0.06 K/UL (ref 0–0.2)
BASOPHILS NFR BLD: 0.7 % (ref 0–1.9)
BILIRUB SERPL-MCNC: 0.8 MG/DL (ref 0.1–1)
BILIRUB UR QL STRIP: NEGATIVE
BNP SERPL-MCNC: 2792 PG/ML (ref 0–99)
BSA FOR ECHO PROCEDURE: 2.18 M2
BUN SERPL-MCNC: 39 MG/DL (ref 6–20)
CALCIUM SERPL-MCNC: 9.1 MG/DL (ref 8.7–10.5)
CHLORIDE SERPL-SCNC: 105 MMOL/L (ref 95–110)
CLARITY UR: CLEAR
CO2 SERPL-SCNC: 23 MMOL/L (ref 23–29)
COLOR UR: ABNORMAL
CREAT SERPL-MCNC: 3.7 MG/DL (ref 0.5–1.4)
CREAT UR-MCNC: 59.7 MG/DL (ref 23–375)
CREAT UR-MCNC: 59.7 MG/DL (ref 23–375)
CV ECHO LV RWT: 0.6 CM
D DIMER PPP IA.FEU-MCNC: 0.76 MG/L FEU
DIFFERENTIAL METHOD: ABNORMAL
DOP CALC AO PEAK VEL: 1 M/S
DOP CALC AO VTI: 15.2 CM
DOP CALC LVOT AREA: 5.3 CM2
DOP CALC LVOT DIAMETER: 2.61 CM
DOP CALC LVOT PEAK VEL: 0.62 M/S
DOP CALC LVOT STROKE VOLUME: 47.59 CM3
DOP CALCLVOT PEAK VEL VTI: 8.9 CM
E WAVE DECELERATION TIME: 179.42 MSEC
E/A RATIO: 2.16
ECHO LV POSTERIOR WALL: 1.77 CM (ref 0.6–1.1)
EOSINOPHIL # BLD AUTO: 0.1 K/UL (ref 0–0.5)
EOSINOPHIL NFR BLD: 1.7 % (ref 0–8)
ERYTHROCYTE [DISTWIDTH] IN BLOOD BY AUTOMATED COUNT: 13.3 % (ref 11.5–14.5)
EST. GFR  (AFRICAN AMERICAN): 21 ML/MIN/1.73 M^2
EST. GFR  (NON AFRICAN AMERICAN): 18 ML/MIN/1.73 M^2
FRACTIONAL SHORTENING: 10 % (ref 28–44)
GLUCOSE SERPL-MCNC: 116 MG/DL (ref 70–110)
GLUCOSE UR QL STRIP: NEGATIVE
HCT VFR BLD AUTO: 41.7 % (ref 40–54)
HGB BLD-MCNC: 13.9 G/DL (ref 14–18)
HGB UR QL STRIP: ABNORMAL
HYALINE CASTS #/AREA URNS LPF: 0 /LPF
IMM GRANULOCYTES # BLD AUTO: 0.02 K/UL (ref 0–0.04)
IMM GRANULOCYTES NFR BLD AUTO: 0.2 % (ref 0–0.5)
INTERVENTRICULAR SEPTUM: 1.63 CM (ref 0.6–1.1)
IVRT: 0.11 MSEC
KETONES UR QL STRIP: NEGATIVE
LA MAJOR: 7.02 CM
LA MINOR: 7.14 CM
LA WIDTH: 3.83 CM
LACTATE SERPL-SCNC: 1.5 MMOL/L (ref 0.5–2.2)
LEFT ATRIUM SIZE: 5.89 CM
LEFT ATRIUM VOLUME INDEX: 62.3 ML/M2
LEFT ATRIUM VOLUME: 135.75 CM3
LEFT INTERNAL DIMENSION IN SYSTOLE: 5.32 CM (ref 2.1–4)
LEFT VENTRICLE DIASTOLIC VOLUME INDEX: 79.73 ML/M2
LEFT VENTRICLE DIASTOLIC VOLUME: 173.64 ML
LEFT VENTRICLE MASS INDEX: 230 G/M2
LEFT VENTRICLE SYSTOLIC VOLUME INDEX: 62.6 ML/M2
LEFT VENTRICLE SYSTOLIC VOLUME: 136.25 ML
LEFT VENTRICULAR INTERNAL DIMENSION IN DIASTOLE: 5.91 CM (ref 3.5–6)
LEFT VENTRICULAR MASS: 500.24 G
LEUKOCYTE ESTERASE UR QL STRIP: NEGATIVE
LYMPHOCYTES # BLD AUTO: 1.1 K/UL (ref 1–4.8)
LYMPHOCYTES NFR BLD: 13.8 % (ref 18–48)
MCH RBC QN AUTO: 30.1 PG (ref 27–31)
MCHC RBC AUTO-ENTMCNC: 33.3 G/DL (ref 32–36)
MCV RBC AUTO: 90 FL (ref 82–98)
MICROSCOPIC COMMENT: NORMAL
MONOCYTES # BLD AUTO: 0.5 K/UL (ref 0.3–1)
MONOCYTES NFR BLD: 5.6 % (ref 4–15)
MV PEAK A VEL: 0.44 M/S
MV PEAK E VEL: 0.95 M/S
NEUTROPHILS # BLD AUTO: 6.2 K/UL (ref 1.8–7.7)
NEUTROPHILS NFR BLD: 78 % (ref 38–73)
NITRITE UR QL STRIP: NEGATIVE
NRBC BLD-RTO: 0 /100 WBC
PH UR STRIP: 6 [PH] (ref 5–8)
PISA TR MAX VEL: 2.54 M/S
PLATELET # BLD AUTO: 233 K/UL (ref 150–350)
PMV BLD AUTO: 11.6 FL (ref 9.2–12.9)
POTASSIUM SERPL-SCNC: 3.3 MMOL/L (ref 3.5–5.1)
PROT SERPL-MCNC: 6.3 G/DL (ref 6–8.4)
PROT UR QL STRIP: ABNORMAL
PROT UR-MCNC: 40 MG/DL
PROT/CREAT UR: 0.67 MG/G{CREAT} (ref 0–0.2)
PULM VEIN S/D RATIO: 0.5
PV PEAK D VEL: 0.8 M/S
PV PEAK S VEL: 0.4 M/S
PV PEAK VELOCITY: 0.67 CM/S
RA MAJOR: 5.85 CM
RA PRESSURE: 8 MMHG
RA WIDTH: 4.68 CM
RBC # BLD AUTO: 4.62 M/UL (ref 4.6–6.2)
RBC #/AREA URNS HPF: 2 /HPF (ref 0–4)
RIGHT VENTRICULAR END-DIASTOLIC DIMENSION: 3.81 CM
RV TISSUE DOPPLER FREE WALL SYSTOLIC VELOCITY 1 (APICAL 4 CHAMBER VIEW): 53.71 CM/S
SINUS: 4.47 CM
SODIUM SERPL-SCNC: 141 MMOL/L (ref 136–145)
SODIUM UR-SCNC: 73 MMOL/L (ref 20–250)
SP GR UR STRIP: 1.01 (ref 1–1.03)
SQUAMOUS #/AREA URNS HPF: 1 /HPF
STJ: 3.6 CM
TR MAX PG: 26 MMHG
TRICUSPID ANNULAR PLANE SYSTOLIC EXCURSION: 2.15 CM
TROPONIN I SERPL DL<=0.01 NG/ML-MCNC: 0.46 NG/ML (ref 0–0.03)
TROPONIN I SERPL DL<=0.01 NG/ML-MCNC: 0.47 NG/ML (ref 0–0.03)
TV REST PULMONARY ARTERY PRESSURE: 34 MMHG
URN SPEC COLLECT METH UR: ABNORMAL
UROBILINOGEN UR STRIP-ACNC: NEGATIVE EU/DL
WBC # BLD AUTO: 8.02 K/UL (ref 3.9–12.7)
WBC #/AREA URNS HPF: 1 /HPF (ref 0–5)

## 2019-11-26 PROCEDURE — 96374 THER/PROPH/DIAG INJ IV PUSH: CPT

## 2019-11-26 PROCEDURE — 63600175 PHARM REV CODE 636 W HCPCS: Performed by: EMERGENCY MEDICINE

## 2019-11-26 PROCEDURE — 93010 ELECTROCARDIOGRAM REPORT: CPT | Mod: ,,, | Performed by: INTERNAL MEDICINE

## 2019-11-26 PROCEDURE — 63600175 PHARM REV CODE 636 W HCPCS: Performed by: INTERNAL MEDICINE

## 2019-11-26 PROCEDURE — 99284 EMERGENCY DEPT VISIT MOD MDM: CPT | Mod: 25,,, | Performed by: INTERNAL MEDICINE

## 2019-11-26 PROCEDURE — 25000003 PHARM REV CODE 250: Performed by: EMERGENCY MEDICINE

## 2019-11-26 PROCEDURE — 93005 ELECTROCARDIOGRAM TRACING: CPT

## 2019-11-26 PROCEDURE — 82570 ASSAY OF URINE CREATININE: CPT

## 2019-11-26 PROCEDURE — 85025 COMPLETE CBC W/AUTO DIFF WBC: CPT

## 2019-11-26 PROCEDURE — 84484 ASSAY OF TROPONIN QUANT: CPT | Mod: 91

## 2019-11-26 PROCEDURE — 83880 ASSAY OF NATRIURETIC PEPTIDE: CPT

## 2019-11-26 PROCEDURE — 80053 COMPREHEN METABOLIC PANEL: CPT

## 2019-11-26 PROCEDURE — 99284 PR EMERGENCY DEPT VISIT,LEVEL IV: ICD-10-PCS | Mod: 25,,, | Performed by: INTERNAL MEDICINE

## 2019-11-26 PROCEDURE — 25000003 PHARM REV CODE 250: Performed by: INTERNAL MEDICINE

## 2019-11-26 PROCEDURE — 20000000 HC ICU ROOM

## 2019-11-26 PROCEDURE — 87632 RESP VIRUS 6-11 TARGETS: CPT

## 2019-11-26 PROCEDURE — 99291 CRITICAL CARE FIRST HOUR: CPT | Mod: 25

## 2019-11-26 PROCEDURE — 81000 URINALYSIS NONAUTO W/SCOPE: CPT

## 2019-11-26 PROCEDURE — 84300 ASSAY OF URINE SODIUM: CPT

## 2019-11-26 PROCEDURE — 87040 BLOOD CULTURE FOR BACTERIA: CPT | Mod: 59

## 2019-11-26 PROCEDURE — 84484 ASSAY OF TROPONIN QUANT: CPT

## 2019-11-26 PROCEDURE — 93010 EKG 12-LEAD: ICD-10-PCS | Mod: ,,, | Performed by: INTERNAL MEDICINE

## 2019-11-26 PROCEDURE — 85379 FIBRIN DEGRADATION QUANT: CPT

## 2019-11-26 PROCEDURE — 83605 ASSAY OF LACTIC ACID: CPT

## 2019-11-26 RX ORDER — ISOSORBIDE MONONITRATE 30 MG/1
30 TABLET, EXTENDED RELEASE ORAL DAILY
Status: DISCONTINUED | OUTPATIENT
Start: 2019-11-26 | End: 2019-11-30 | Stop reason: HOSPADM

## 2019-11-26 RX ORDER — NICARDIPINE HYDROCHLORIDE 0.2 MG/ML
INJECTION INTRAVENOUS
Status: DISPENSED
Start: 2019-11-26 | End: 2019-11-27

## 2019-11-26 RX ORDER — FUROSEMIDE 10 MG/ML
40 INJECTION INTRAMUSCULAR; INTRAVENOUS 2 TIMES DAILY
Status: COMPLETED | OUTPATIENT
Start: 2019-11-26 | End: 2019-11-27

## 2019-11-26 RX ORDER — NICARDIPINE HYDROCHLORIDE 0.2 MG/ML
2.5 INJECTION INTRAVENOUS CONTINUOUS
Status: DISCONTINUED | OUTPATIENT
Start: 2019-11-26 | End: 2019-11-27

## 2019-11-26 RX ORDER — HEPARIN SODIUM 5000 [USP'U]/ML
5000 INJECTION, SOLUTION INTRAVENOUS; SUBCUTANEOUS EVERY 12 HOURS
Status: DISCONTINUED | OUTPATIENT
Start: 2019-11-26 | End: 2019-11-30 | Stop reason: HOSPADM

## 2019-11-26 RX ORDER — SODIUM CHLORIDE 0.9 % (FLUSH) 0.9 %
10 SYRINGE (ML) INJECTION
Status: DISCONTINUED | OUTPATIENT
Start: 2019-11-26 | End: 2019-11-30 | Stop reason: HOSPADM

## 2019-11-26 RX ORDER — ISOSORBIDE MONONITRATE 30 MG/1
30 TABLET, EXTENDED RELEASE ORAL DAILY
Status: DISCONTINUED | OUTPATIENT
Start: 2019-11-27 | End: 2019-11-26

## 2019-11-26 RX ORDER — LOSARTAN POTASSIUM 100 MG/1
100 TABLET ORAL DAILY
Status: ON HOLD | COMMUNITY
End: 2019-11-29 | Stop reason: HOSPADM

## 2019-11-26 RX ORDER — ISOSORBIDE MONONITRATE 30 MG/1
60 TABLET, EXTENDED RELEASE ORAL DAILY
Status: DISCONTINUED | OUTPATIENT
Start: 2019-11-26 | End: 2019-11-26

## 2019-11-26 RX ORDER — VERAPAMIL HYDROCHLORIDE 120 MG/1
120 TABLET, FILM COATED, EXTENDED RELEASE ORAL DAILY
Status: DISCONTINUED | OUTPATIENT
Start: 2019-11-27 | End: 2019-11-26

## 2019-11-26 RX ORDER — AZITHROMYCIN 250 MG/1
500 TABLET, FILM COATED ORAL DAILY
Status: DISCONTINUED | OUTPATIENT
Start: 2019-11-26 | End: 2019-11-26

## 2019-11-26 RX ORDER — ASPIRIN 325 MG
325 TABLET ORAL DAILY
Status: DISCONTINUED | OUTPATIENT
Start: 2019-11-26 | End: 2019-11-26

## 2019-11-26 RX ORDER — HYDRALAZINE HYDROCHLORIDE 25 MG/1
50 TABLET, FILM COATED ORAL EVERY 8 HOURS
Status: DISCONTINUED | OUTPATIENT
Start: 2019-11-26 | End: 2019-11-27

## 2019-11-26 RX ORDER — DOXYCYCLINE HYCLATE 100 MG
100 TABLET ORAL EVERY 12 HOURS
Status: DISCONTINUED | OUTPATIENT
Start: 2019-11-27 | End: 2019-11-27

## 2019-11-26 RX ADMIN — AZITHROMYCIN MONOHYDRATE 500 MG: 250 TABLET ORAL at 01:11

## 2019-11-26 RX ADMIN — ASPIRIN 325 MG ORAL TABLET 325 MG: 325 PILL ORAL at 02:11

## 2019-11-26 RX ADMIN — CEFTRIAXONE 1 G: 1 INJECTION, SOLUTION INTRAVENOUS at 02:11

## 2019-11-26 RX ADMIN — FUROSEMIDE 40 MG: 10 INJECTION, SOLUTION INTRAVENOUS at 05:11

## 2019-11-26 RX ADMIN — HYDRALAZINE HYDROCHLORIDE 50 MG: 25 TABLET ORAL at 03:11

## 2019-11-26 RX ADMIN — HEPARIN SODIUM 5000 UNITS: 5000 INJECTION, SOLUTION INTRAVENOUS; SUBCUTANEOUS at 10:11

## 2019-11-26 RX ADMIN — NICARDIPINE HYDROCHLORIDE 5 MG/HR: 0.2 INJECTION, SOLUTION INTRAVENOUS at 07:11

## 2019-11-26 NOTE — ED NOTES
Two patient identifiers have been checked and are correct.      Appearance: Pt awake, alert & oriented to person, place & time. Pt in no acute distress at present time. Pt is clean and well groomed with clothes appropriately fastened.   Skin: Skin warm, dry & intact. Color consistent with ethnicity. Mucous membranes moist. No breakdown or brusing noted.   Musculoskeletal: Patient moving all extremities well, no obvious swelling or deformities noted.   Respiratory: Respirations spontaneous, even, and non-labored. Visible chest rise noted. Airway is open and patent. No accessory muscle use noted. Patient reports shortness of breath and productive cough.   Neurologic: Sensation is intact. Speech is clear and appropriate. Eyes open spontaneously, behavior appropriate to situation, follows commands, facial expression symmetrical, bilateral hand grasp equal and even, purposeful motor response noted.  Cardiac: All peripheral pulses present. No Bilateral lower extremity edema. Cap refill is <3 seconds.  Abdomen: Abdomen soft, non-tender to palpation.   : Pt reports no dysuria or hematuria.   Patient denies fever at home.

## 2019-11-26 NOTE — CONSULTS
"[facility]  Cardiology  Consult Note    Patient Name: Timo Youssef  MRN: [id number]  Admission Date: 11/26/2019  Hospital Length of Stay: 0 days  Code Status: Full Code   Attending Provider: Blayne Hernandez MD   Consulting Provider: Alber Mora MD  Primary Care Physician: Primary Doctor No  Principal Problem:Acute renal failure    Patient information was obtained from patient and ER records.     Inpatient consult to Cardiology  Consult performed by: Alber Mora MD  Consult ordered by: Margie Soto MD  Reason for consult: CHF        Subjective:     Chief Complaint:  SOB     HPI:   44 year old male with hypertension, chronic kidney disease and who is a cigar smoker who presented with progressive shortness of breath of 1.5 weeks in evolution. Associated symptoms include cough, chills, lower extremity swelling and chest pain with coughing. Denied fever or sick contacts. 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 He was seen at urgent care today. Had a chest ray obtained and was told to come here for evaluation of "multifocal pneumonia". Only medication he takes at home is verapamil. Used to be on losartan but taken off due to recall. Is allergic to lisinopril.  In the ED, patient was hypertensive. Afebrile and speaking mid sentences but with adequate sats at room air and in no distress. Labwork significant for D-Dimer 0.76, BNP 2792, trop 0.456 and EKG with prolonged QTc, T wave inversion V2/V6. Patient admitted to hospital medicine for possible viral syndrome vs bacterial pneumonia, acute renal failure and Cardiology evaluation.     Patient presents to the emergency room the Urgent Care with concerns regarding possible pneumonia.  His chest x-ray notes bilateral infiltrates, and his echocardiogram noted severe LV systolic dysfunction with ejection fraction 15%.  The patient is unaware of this.  He is aware of " significant renal insufficiency as well as uncontrolled high blood pressure.  His creatinine is in the mid threes at present.  He is being admitted for further evaluation.    Past Medical History:   Diagnosis Date    Cigarette smoker     Hypertension     Pneumonia 11/26/2019       Past Surgical History:   Procedure Laterality Date    skull fracture repair      in childhood       Review of patient's allergies indicates:   Allergen Reactions    Lisinopril (bulk) Other (See Comments)     Angioedema         No current facility-administered medications on file prior to encounter.      Current Outpatient Medications on File Prior to Encounter   Medication Sig    losartan (COZAAR) 100 MG tablet Take 100 mg by mouth once daily.    verapamil (CALAN-SR) 120 MG CR tablet Take 1 tablet (120 mg total) by mouth once daily.    cetirizine (ZYRTEC) 10 MG tablet Take 1 tablet (10 mg total) by mouth once daily. for 6 days    hydrALAZINE (APRESOLINE) 50 MG tablet Take 1 tablet (50 mg total) by mouth 3 (three) times daily.     Family History     None        Tobacco Use    Smoking status: Current Every Day Smoker     Packs/day: 0.25     Types: Cigars    Smokeless tobacco: Never Used   Substance and Sexual Activity    Alcohol use: Yes    Drug use: No    Sexual activity: Not on file     Review of Systems   Constitution: Negative for chills, diaphoresis, fever and malaise/fatigue.   HENT: Negative for nosebleeds.    Eyes: Negative for blurred vision and double vision.   Cardiovascular: Positive for leg swelling and orthopnea. Negative for chest pain, claudication, cyanosis, dyspnea on exertion, palpitations, paroxysmal nocturnal dyspnea and syncope.   Respiratory: Positive for cough and shortness of breath. Negative for wheezing.    Skin: Negative for dry skin and poor wound healing.   Musculoskeletal: Negative for back pain, joint swelling and myalgias.   Gastrointestinal: Negative for abdominal pain, nausea and vomiting.    Genitourinary: Negative for hematuria.   Neurological: Negative for dizziness, headaches, numbness, seizures and weakness.   Psychiatric/Behavioral: Negative for altered mental status and depression.     Objective:     Vital Signs (Most Recent):  Temp: 98.5 °F (36.9 °C) (11/26/19 1649)  Pulse: 92 (11/26/19 1649)  Resp: (!) 22 (11/26/19 1649)  BP: (!) 174/118 (11/26/19 1649)  SpO2: 97 % (11/26/19 1649) Vital Signs (24h Range):  Temp:  [98.2 °F (36.8 °C)-98.5 °F (36.9 °C)] 98.5 °F (36.9 °C)  Pulse:  [] 92  Resp:  [20-29] 22  SpO2:  [94 %-98 %] 97 %  BP: (172-198)/(108-144) 174/118     Weight: 91.2 kg (201 lb)  Body mass index is 25.81 kg/m².    SpO2: 97 %  O2 Device (Oxygen Therapy): room air      Intake/Output Summary (Last 24 hours) at 11/26/2019 1655  Last data filed at 11/26/2019 1404  Gross per 24 hour   Intake 50 ml   Output --   Net 50 ml       Lines/Drains/Airways     Peripheral Intravenous Line                 Peripheral IV - Single Lumen 11/26/19 1318 18 G Right Antecubital less than 1 day                Physical Exam   Constitutional: He is oriented to person, place, and time. He appears well-developed and well-nourished.   HENT:   Head: Normocephalic and atraumatic.   Eyes: Pupils are equal, round, and reactive to light. Conjunctivae and EOM are normal. No scleral icterus.   Neck: Normal range of motion. Neck supple. No JVD present. No tracheal deviation present. No thyromegaly present.   Cardiovascular: Regular rhythm, S1 normal and S2 normal. Tachycardia present. Exam reveals no gallop and no friction rub.   No murmur heard.  Pulmonary/Chest: Effort normal. No respiratory distress. He has no wheezes. He has rales. He exhibits no tenderness.   Abdominal: Soft. He exhibits no distension.   Musculoskeletal: Normal range of motion. He exhibits edema (R>L).   Neurological: He is alert and oriented to person, place, and time. He has normal strength. No cranial nerve deficit.   Skin: Skin is warm and  dry. No rash noted.   Psychiatric: He has a normal mood and affect. His behavior is normal.       Current Medications:   [START ON 11/27/2019] doxycycline  100 mg Oral Q12H    heparin (porcine)  5,000 Units Subcutaneous Q12H    hydrALAZINE  50 mg Oral Q8H    [START ON 11/27/2019] verapamil  120 mg Oral Daily       influenza, pneumoc 13-anaid conj-dip cr(PF), sodium chloride 0.9%    Laboratory (all labs reviewed):  CBC:  Recent Labs   Lab 03/05/19 1940 11/26/19  1300   WBC 7.33 8.02   Hemoglobin 14.8 13.9 L   Hematocrit 45.5 41.7   Platelets 252 233       CHEMISTRIES:  Recent Labs   Lab 03/05/19 1940 11/26/19  1300   Glucose 76 116 H   Sodium 140 141   Potassium 3.9 3.3 L   BUN, Bld 23 H 39 H   Creatinine 2.3 H 3.7 H   eGFR if  38 A 21 A   eGFR if non  33 A 18 A   Calcium 9.9 9.1       CARDIAC BIOMARKERS:  Recent Labs   Lab 11/26/19  1300   Troponin I 0.456 H       COAGS:  Recent Labs   Lab 03/05/19 1940   INR 1.0       LIPIDS/LFTS:  Recent Labs   Lab 03/05/19 1940 11/26/19  1300   AST 22 31   ALT 17 33       BNP:  Recent Labs   Lab 11/26/19  1300   BNP 2,792 H       TSH:        Free T4:        Diagnostic Results:  ECG (personally reviewed and interpreted tracing(s)):  11/26/19 1255 , LAD, NSSTTW changes    Chest X-Ray (personally reviewed and interpreted image(s)): 11/26/19 ?bilat PNA vs [place] pulm edema    Echo: 11/26/19 (images personally reviewed and interpreted)  · Severely decreased left ventricular systolic function. The estimated ejection fraction is 15%  · Severe global hypokinetic wall motion.  · Moderate concentric left ventricular hypertrophy.  · Mild left ventricular enlargement.  · Grade III (severe) left ventricular diastolic dysfunction consistent with restrictive physiology.  · The sinuses of Valsalva is moderately dilated. 4.5cm.  · Mildly reduced right ventricular systolic function.  · Mild mitral regurgitation.  · The estimated PA systolic pressure is  34 mm Hg          Assessment and Plan:     * Acute renal failure  Mgmt per renal  Likely cardiorenal synd  Avoid nephrotoxins    Dilated cardiomyopathy  EF 15%, global HK on echo  ?etiology, ddx wide at this point  Current sxs of cough/SOB/edema likely d/t vol overload  Stop verapamil  Start hydrala/Imdur/lasix  Goal net neg 1-1.5L daily  Eventual isch eval pending clinical course (will need significant improvement in renal fxn to consider cath).    Shortness of breath  As per DCM section    Lower extremity edema  R>L  As per DCM section  LE venous US ordered    Chronic kidney disease, stage 3  Creat 2.3 in 3/3019, now 3.7.  Neph eval planned    Elevated troponin I level  Likely demand/CHF/CKD  No anginal sxs, doubt ACS    Essential hypertension  Med rx as above  Cont hydral/imdur  Eventual BBL        VTE Risk Mitigation (From admission, onward)         Ordered     heparin (porcine) injection 5,000 Units  Every 12 hours      11/26/19 1531     Place HARSHIL hose  Until discontinued      11/26/19 1530     Place sequential compression device  Until discontinued      11/26/19 1530                Thank you for your consult. I will follow-up with patient. Please contact us if you have any additional questions.    Alber Mora MD  Cardiology   [facility]

## 2019-11-26 NOTE — SUBJECTIVE & OBJECTIVE
Past Medical History:   Diagnosis Date    Cigarette smoker     Hypertension     Pneumonia 11/26/2019       Past Surgical History:   Procedure Laterality Date    skull fracture repair      in childhood       Review of patient's allergies indicates:   Allergen Reactions    Lisinopril (bulk) Other (See Comments)     Angioedema         No current facility-administered medications on file prior to encounter.      Current Outpatient Medications on File Prior to Encounter   Medication Sig    losartan (COZAAR) 100 MG tablet Take 100 mg by mouth once daily.    verapamil (CALAN-SR) 120 MG CR tablet Take 1 tablet (120 mg total) by mouth once daily.    cetirizine (ZYRTEC) 10 MG tablet Take 1 tablet (10 mg total) by mouth once daily. for 6 days    hydrALAZINE (APRESOLINE) 50 MG tablet Take 1 tablet (50 mg total) by mouth 3 (three) times daily.     Family History     None        Tobacco Use    Smoking status: Current Every Day Smoker     Packs/day: 0.25     Types: Cigars    Smokeless tobacco: Never Used   Substance and Sexual Activity    Alcohol use: Yes    Drug use: No    Sexual activity: Not on file     Review of Systems   Constitution: Negative for chills, diaphoresis, fever and malaise/fatigue.   HENT: Negative for nosebleeds.    Eyes: Negative for blurred vision and double vision.   Cardiovascular: Positive for leg swelling and orthopnea. Negative for chest pain, claudication, cyanosis, dyspnea on exertion, palpitations, paroxysmal nocturnal dyspnea and syncope.   Respiratory: Positive for cough and shortness of breath. Negative for wheezing.    Skin: Negative for dry skin and poor wound healing.   Musculoskeletal: Negative for back pain, joint swelling and myalgias.   Gastrointestinal: Negative for abdominal pain, nausea and vomiting.   Genitourinary: Negative for hematuria.   Neurological: Negative for dizziness, headaches, numbness, seizures and weakness.   Psychiatric/Behavioral: Negative for altered mental  status and depression.     Objective:     Vital Signs (Most Recent):  Temp: 98.5 °F (36.9 °C) (11/26/19 1649)  Pulse: 92 (11/26/19 1649)  Resp: (!) 22 (11/26/19 1649)  BP: (!) 174/118 (11/26/19 1649)  SpO2: 97 % (11/26/19 1649) Vital Signs (24h Range):  Temp:  [98.2 °F (36.8 °C)-98.5 °F (36.9 °C)] 98.5 °F (36.9 °C)  Pulse:  [] 92  Resp:  [20-29] 22  SpO2:  [94 %-98 %] 97 %  BP: (172-198)/(108-144) 174/118     Weight: 91.2 kg (201 lb)  Body mass index is 25.81 kg/m².    SpO2: 97 %  O2 Device (Oxygen Therapy): room air      Intake/Output Summary (Last 24 hours) at 11/26/2019 1655  Last data filed at 11/26/2019 1404  Gross per 24 hour   Intake 50 ml   Output --   Net 50 ml       Lines/Drains/Airways     Peripheral Intravenous Line                 Peripheral IV - Single Lumen 11/26/19 1318 18 G Right Antecubital less than 1 day                Physical Exam   Constitutional: He is oriented to person, place, and time. He appears well-developed and well-nourished.   HENT:   Head: Normocephalic and atraumatic.   Eyes: Pupils are equal, round, and reactive to light. Conjunctivae and EOM are normal. No scleral icterus.   Neck: Normal range of motion. Neck supple. No JVD present. No tracheal deviation present. No thyromegaly present.   Cardiovascular: Regular rhythm, S1 normal and S2 normal. Tachycardia present. Exam reveals no gallop and no friction rub.   No murmur heard.  Pulmonary/Chest: Effort normal. No respiratory distress. He has no wheezes. He has rales. He exhibits no tenderness.   Abdominal: Soft. He exhibits no distension.   Musculoskeletal: Normal range of motion. He exhibits edema (R>L).   Neurological: He is alert and oriented to person, place, and time. He has normal strength. No cranial nerve deficit.   Skin: Skin is warm and dry. No rash noted.   Psychiatric: He has a normal mood and affect. His behavior is normal.       Current Medications:   [START ON 11/27/2019] doxycycline  100 mg Oral Q12H     heparin (porcine)  5,000 Units Subcutaneous Q12H    hydrALAZINE  50 mg Oral Q8H    [START ON 11/27/2019] verapamil  120 mg Oral Daily       influenza, pneumoc 13-anaid conj-dip cr(PF), sodium chloride 0.9%    Laboratory (all labs reviewed):  CBC:  Recent Labs   Lab 03/05/19 1940 11/26/19  1300   WBC 7.33 8.02   Hemoglobin 14.8 13.9 L   Hematocrit 45.5 41.7   Platelets 252 233       CHEMISTRIES:  Recent Labs   Lab 03/05/19 1940 11/26/19  1300   Glucose 76 116 H   Sodium 140 141   Potassium 3.9 3.3 L   BUN, Bld 23 H 39 H   Creatinine 2.3 H 3.7 H   eGFR if  38 A 21 A   eGFR if non  33 A 18 A   Calcium 9.9 9.1       CARDIAC BIOMARKERS:  Recent Labs   Lab 11/26/19  1300   Troponin I 0.456 H       COAGS:  Recent Labs   Lab 03/05/19 1940   INR 1.0       LIPIDS/LFTS:  Recent Labs   Lab 03/05/19 1940 11/26/19  1300   AST 22 31   ALT 17 33       BNP:  Recent Labs   Lab 11/26/19  1300   BNP 2,792 H       TSH:        Free T4:        Diagnostic Results:  ECG (personally reviewed and interpreted tracing(s)):  11/26/19 1255 , LAD, NSSTTW changes    Chest X-Ray (personally reviewed and interpreted image(s)): 11/26/19 ?bilat PNA vs [place] pulm edema    Echo: 11/26/19 (images personally reviewed and interpreted)  · Severely decreased left ventricular systolic function. The estimated ejection fraction is 15%  · Severe global hypokinetic wall motion.  · Moderate concentric left ventricular hypertrophy.  · Mild left ventricular enlargement.  · Grade III (severe) left ventricular diastolic dysfunction consistent with restrictive physiology.  · The sinuses of Valsalva is moderately dilated. 4.5cm.  · Mildly reduced right ventricular systolic function.  · Mild mitral regurgitation.  · The estimated PA systolic pressure is 34 mm Hg

## 2019-11-26 NOTE — ASSESSMENT & PLAN NOTE
Unknown etiology   Urine prot/Cr ratio, urinalysis, urine sodium, urine Cr pending  Retroperitoneal ultrasound  No indication for emergent/urgent dialysis  Treat hypertension  Nephrology consulted

## 2019-11-26 NOTE — SUBJECTIVE & OBJECTIVE
Past Medical History:   Diagnosis Date    Cigarette smoker     Hypertension     Pneumonia 11/26/2019       Past Surgical History:   Procedure Laterality Date    skull fracture repair      in childhood       Review of patient's allergies indicates:   Allergen Reactions    Lisinopril (bulk) Other (See Comments)     Angioedema         No current facility-administered medications on file prior to encounter.      Current Outpatient Medications on File Prior to Encounter   Medication Sig    losartan (COZAAR) 100 MG tablet Take 100 mg by mouth once daily.    verapamil (CALAN-SR) 120 MG CR tablet Take 1 tablet (120 mg total) by mouth once daily.    cetirizine (ZYRTEC) 10 MG tablet Take 1 tablet (10 mg total) by mouth once daily. for 6 days    hydrALAZINE (APRESOLINE) 50 MG tablet Take 1 tablet (50 mg total) by mouth 3 (three) times daily.     Family History     None        Tobacco Use    Smoking status: Current Every Day Smoker     Packs/day: 0.25     Types: Cigars    Smokeless tobacco: Never Used   Substance and Sexual Activity    Alcohol use: Yes    Drug use: No    Sexual activity: Not on file     Review of Systems   Constitutional: Positive for chills. Negative for fever.   HENT: Negative.    Eyes: Negative.    Respiratory: Positive for cough and shortness of breath.    Cardiovascular: Positive for chest pain (with coughing) and leg swelling.   Gastrointestinal: Negative.    Endocrine: Negative.    Genitourinary: Negative.    Musculoskeletal: Negative.    Skin: Negative.    Neurological: Negative.    Hematological: Negative.    Psychiatric/Behavioral: Negative.      Objective:     Vital Signs (Most Recent):  Temp: 98.4 °F (36.9 °C) (11/26/19 1247)  Pulse: 92 (11/26/19 1502)  Resp: (!) 27 (11/26/19 1502)  BP: (!) 188/133 (11/26/19 1502)  SpO2: 96 % (11/26/19 1502) Vital Signs (24h Range):  Temp:  [98.4 °F (36.9 °C)] 98.4 °F (36.9 °C)  Pulse:  [] 92  Resp:  [20-29] 27  SpO2:  [94 %-98 %] 96 %  BP:  (180-198)/(121-134) 188/133     Weight: 91.2 kg (201 lb)  Body mass index is 25.81 kg/m².    Physical Exam   Constitutional: He is oriented to person, place, and time. He appears well-developed. No distress.   HENT:   Head: Normocephalic and atraumatic.   Eyes: Conjunctivae are normal.   Neck: Normal range of motion.   Cardiovascular: Normal rate and regular rhythm.   Pulmonary/Chest: Effort normal. He has no wheezes. He has no rales.   Speaking mid sentences but with appropriate sats at room air   Abdominal: Soft.   Musculoskeletal: He exhibits edema (+ 1 BLE).   Neurological: He is alert and oriented to person, place, and time.   Skin: Skin is warm and dry. Capillary refill takes 2 to 3 seconds. He is not diaphoretic.   Psychiatric: He has a normal mood and affect. His behavior is normal. Judgment and thought content normal.   Nursing note and vitals reviewed.          Significant Labs: All pertinent labs within the past 24 hours have been reviewed.    Significant Imaging: I have reviewed all pertinent imaging results/findings within the past 24 hours.  I have reviewed and interpreted all pertinent imaging results/findings within the past 24 hours.

## 2019-11-26 NOTE — H&P
"[facility] Hospital Medicine  History & Physical    Patient Name: Timo Youssef  MRN: [id number]  Admission Date: 11/26/2019  Attending Physician: Blayne Hernandez MD   Primary Care Provider: Primary Doctor No         Patient information was obtained from patient, past medical records and ER records.     Subjective:     Principal Problem:Acute renal failure    Chief Complaint:   Chief Complaint   Patient presents with    Shortness of Breath     x2 weeks of SOB, cough, cold, fever. was seen at [facility] and was told after xray that he needs further eval for pneumonia. denies CP        HPI: 44 year old male with hypertension, chronic kidney disease and who is a cigar smoker who presented with progressive shortness of breath of 1.5 weeks in evolution. Associated symptoms include cough, chills, lower extremity swelling and chest pain with coughing. Denied fever or sick contacts. 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 He was seen at urgent care today. Had a chest ray obtained and was told to come here for evaluation of "multifocal pneumonia". Only medication he takes at home is verapamil. Used to be on losartan but taken off due to recall. Is allergic to lisinopril.    In the ED, patient was hypertensive. Afebrile and speaking mid sentences but with adequate sats at room air and in no distress. Labwork significant for D-Dimer 0.76, BNP 2792, trop 0.456 and EKG with prolonged QTc, T wave inversion V2/V6. Patient admitted to hospital medicine for possible viral syndrome vs bacterial pneumonia, acute renal failure and Cardiology evaluation.     Past Medical History:   Diagnosis Date    Cigarette smoker     Hypertension     Pneumonia 11/26/2019       Past Surgical History:   Procedure Laterality Date    skull fracture repair      in childhood       Review of patient's allergies indicates:   Allergen Reactions    Lisinopril (bulk) Other " (See Comments)     Angioedema         No current facility-administered medications on file prior to encounter.      Current Outpatient Medications on File Prior to Encounter   Medication Sig    losartan (COZAAR) 100 MG tablet Take 100 mg by mouth once daily.    verapamil (CALAN-SR) 120 MG CR tablet Take 1 tablet (120 mg total) by mouth once daily.    cetirizine (ZYRTEC) 10 MG tablet Take 1 tablet (10 mg total) by mouth once daily. for 6 days    hydrALAZINE (APRESOLINE) 50 MG tablet Take 1 tablet (50 mg total) by mouth 3 (three) times daily.     Family History     None        Tobacco Use    Smoking status: Current Every Day Smoker     Packs/day: 0.25     Types: Cigars    Smokeless tobacco: Never Used   Substance and Sexual Activity    Alcohol use: Yes    Drug use: No    Sexual activity: Not on file     Review of Systems   Constitutional: Positive for chills. Negative for fever.   HENT: Negative.    Eyes: Negative.    Respiratory: Positive for cough and shortness of breath.    Cardiovascular: Positive for chest pain (with coughing) and leg swelling.   Gastrointestinal: Negative.    Endocrine: Negative.    Genitourinary: Negative.    Musculoskeletal: Negative.    Skin: Negative.    Neurological: Negative.    Hematological: Negative.    Psychiatric/Behavioral: Negative.      Objective:     Vital Signs (Most Recent):  Temp: 98.4 °F (36.9 °C) (11/26/19 1247)  Pulse: 92 (11/26/19 1502)  Resp: (!) 27 (11/26/19 1502)  BP: (!) 188/133 (11/26/19 1502)  SpO2: 96 % (11/26/19 1502) Vital Signs (24h Range):  Temp:  [98.4 °F (36.9 °C)] 98.4 °F (36.9 °C)  Pulse:  [] 92  Resp:  [20-29] 27  SpO2:  [94 %-98 %] 96 %  BP: (180-198)/(121-134) 188/133     Weight: 91.2 kg (201 lb)  Body mass index is 25.81 kg/m².    Physical Exam   Constitutional: He is oriented to person, place, and time. He appears well-developed. No distress.   HENT:   Head: Normocephalic and atraumatic.   Eyes: Conjunctivae are normal.   Neck: Normal  "range of motion.   Cardiovascular: Normal rate and regular rhythm.   Pulmonary/Chest: Effort normal. He has no wheezes. He has no rales.   Speaking mid sentences but with appropriate sats at room air   Abdominal: Soft.   Musculoskeletal: He exhibits edema (+ 1 BLE).   Neurological: He is alert and oriented to person, place, and time.   Skin: Skin is warm and dry. Capillary refill takes 2 to 3 seconds. He is not diaphoretic.   Psychiatric: He has a normal mood and affect. His behavior is normal. Judgment and thought content normal.   Nursing note and vitals reviewed.          Significant Labs: All pertinent labs within the past 24 hours have been reviewed.    Significant Imaging: I have reviewed all pertinent imaging results/findings within the past 24 hours.  I have reviewed and interpreted all pertinent imaging results/findings within the past 24 hours.    Assessment/Plan:     * Acute renal failure  Unknown etiology   Urine prot/Cr ratio, urinalysis, urine sodium, urine Cr pending  Retroperitoneal ultrasound  No indication for emergent/urgent dialysis  Treat hypertension  Nephrology consulted      Elevated troponin I level  Elevated troponin 0.4 in setting of poor renal clearance  Is chest pain free. Pain reported likely related to "strong cough" as it only happened with cough  Echo ordered. Treat hypertension. Repeat trop x 2  Lipid panel  Agree with cardiology consultation      Shortness of breath  As above      Abnormal chest x-ray  Pulmonary edema vs pneumonia  QTc is prolonged therefore will hold off on azithromycin  Start doxycycline empirically  Will discuss diuresis with nephrology  Resp viral panel ordered. Results are pending  Symptomatic treatment      Essential hypertension  Poorly controlled  Continue verapamil. Add hydralazine for better control  Vitals q 4 hours      Tobacco use disorder, severe, dependence  Spent > 5 minutes on smoking cessation education  Patient motivated to quit but not " interested in nicotine patch at this time      Chronic kidney disease, stage 3  With acute renal failure  See above      Chills (without fever)  Afebrile  Supportive treatment      Non-productive cough        Acute viral syndrome  Suspected given reported symptoms  Viral resp panel pending  Supportive treatment      Lower extremity edema  Lower ext ultrasound to R/o DVT  Eval for heart failure pending        VTE Risk Mitigation (From admission, onward)         Ordered     heparin (porcine) injection 5,000 Units  Every 12 hours      11/26/19 1531     Place HARSHIL hose  Until discontinued      11/26/19 1530     Place sequential compression device  Until discontinued      11/26/19 1530                   Margie Mccann MD  Department of Hospital Medicine   [facility]

## 2019-11-26 NOTE — ASSESSMENT & PLAN NOTE
Spent > 5 minutes on smoking cessation education  Patient motivated to quit but not interested in nicotine patch at this time

## 2019-11-26 NOTE — ASSESSMENT & PLAN NOTE
EF 15%, global HK on echo  ?etiology, ddx wide at this point  Current sxs of cough/SOB/edema likely d/t vol overload  Stop verapamil  Start hydrala/Imdur/lasix  Goal net neg 1-1.5L daily  Eventual isch eval pending clinical course (will need significant improvement in renal fxn to consider cath).

## 2019-11-26 NOTE — HPI
"44 year old male with hypertension, chronic kidney disease and who is a cigar smoker who presented with progressive shortness of breath of 1.5 weeks in evolution. Associated symptoms include cough, chills, lower extremity swelling and chest pain with coughing. Denied fever or sick contacts. 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 He was seen at urgent care today. Had a chest ray obtained and was told to come here for evaluation of "multifocal pneumonia". Only medication he takes at home is verapamil. Used to be on losartan but taken off due to recall. Is allergic to lisinopril.  In the ED, patient was hypertensive. Afebrile and speaking mid sentences but with adequate sats at room air and in no distress. Labwork significant for D-Dimer 0.76, BNP 2792, trop 0.456 and EKG with prolonged QTc, T wave inversion V2/V6. Patient admitted to hospital medicine for possible viral syndrome vs bacterial pneumonia, acute renal failure and Cardiology evaluation.     Patient presents to the emergency room the Urgent Care with concerns regarding possible pneumonia.  His chest x-ray notes bilateral infiltrates, and his echocardiogram noted severe LV systolic dysfunction with ejection fraction 15%.  The patient is unaware of this.  He is aware of significant renal insufficiency as well as uncontrolled high blood pressure.  His creatinine is in the mid threes at present.  He is being admitted for further evaluation.  "

## 2019-11-26 NOTE — ASSESSMENT & PLAN NOTE
"Elevated troponin 0.4 in setting of poor renal clearance  Is chest pain free. Pain reported likely related to "strong cough" as it only happened with cough  Echo ordered. Treat hypertension. Repeat trop x 2  Agree with cardiology consultation    "

## 2019-11-26 NOTE — ED PROVIDER NOTES
Encounter Date: 11/26/2019    This is a SORT/MSE of a 44 y.o. male presenting to the ED with c/o shortness of breath - sent to ED from  for PNA on CXR. They did not rx any meds. Patient also noted to have elevated BP in triage - hx of HTN. Care will be transferred to an alternate provider when patient is roomed for a full evaluation and final disposition. ROLF Olivo, FNP-C 11/26/2019 12:46 PM    SCRIBE #1 NOTE: I, Obinna Booth, am scribing for, and in the presence of,  Blayne Hernandez MD. I have scribed the following portions of the note - Other sections scribed: HPI, ROS.       History     Chief Complaint   Patient presents with    Shortness of Breath     x2 weeks of SOB, cough, cold, fever. was seen at  [facility] and was told after xray that he needs further eval for pneumonia. denies CP     44 y.o M with a hx of HTN presents to the ED c/o SOB with associated productive cough x2 weeks. He was seen at an urgent care this AM PTA and was instructed to come to the ED after his chest X-ray showed evidence of pneumonia. He does smoke cigarettes. No recent travel. No recent abx use. He denies nasal congestion, dysuria, nausea, emesis and any other associated symptoms. He last attempted tx with an OTC decongestant x3 days ago. He was compliant with his prescribed Varapamil this AM.     The history is provided by the patient.     Review of patient's allergies indicates:   Allergen Reactions    Lisinopril (bulk) Other (See Comments)     Angioedema       Past Medical History:   Diagnosis Date    Hypertension      Past Surgical History:   Procedure Laterality Date    BRAIN SURGERY       History reviewed. No pertinent family history.  Social History     Tobacco Use    Smoking status: Current Every Day Smoker     Types: Cigars   Substance Use Topics    Alcohol use: Yes    Drug use: No     Review of Systems   Constitutional: Negative for diaphoresis.   HENT: Negative for rhinorrhea and sore throat.    Eyes: Negative  for redness.   Respiratory: Positive for cough and shortness of breath.    Cardiovascular: Negative for chest pain.   Gastrointestinal: Negative for abdominal pain, diarrhea, nausea and vomiting.   Genitourinary: Negative for dysuria.   Musculoskeletal: Negative for back pain.   Skin: Negative for color change.   Neurological: Negative for syncope and weakness.   Psychiatric/Behavioral: The patient is not nervous/anxious.        Physical Exam     Initial Vitals [11/26/19 1247]   BP Pulse Resp Temp SpO2   (!) 198/131 (!) 111 20 98.4 °F (36.9 °C) 98 %      MAP       --         Physical Exam    Nursing note and vitals reviewed.  HENT:   Head: Atraumatic.   Eyes: Conjunctivae and EOM are normal.   Neck: Normal range of motion.   Cardiovascular: Exam reveals no gallop and no friction rub.    No murmur heard.  tachycardia   Pulmonary/Chest: Breath sounds normal. No respiratory distress. He has no wheezes. He has no rales.   Abdominal: Soft. Bowel sounds are normal. He exhibits no distension. There is no tenderness.   Musculoskeletal: Normal range of motion. He exhibits no edema.   Neurological: He is alert and oriented to person, place, and time.   Skin: Skin is warm and dry.   Psychiatric: He has a normal mood and affect.         ED Course   Critical Care  Date/Time: 11/26/2019 2:37 PM  Performed by: Blayne Hernandez MD  Authorized by: Blayne Hernandez MD   Direct patient critical care time: 28 minutes  Ordering / reviewing critical care time: 10 minutes  Documentation critical care time: 10 minutes  Consulting other physicians critical care time: 5 minutes  Total critical care time (exclusive of procedural time) : 53 minutes  Critical care was necessary to treat or prevent imminent or life-threatening deterioration of the following conditions: cardiac failure, respiratory failure, circulatory failure and renal failure.  Critical care was time spent personally by me on the following activities: discussions with  consultants, evaluation of patient's response to treatment, development of treatment plan with patient or surrogate, examination of patient, ordering and performing treatments and interventions, obtaining history from patient or surrogate, ordering and review of laboratory studies, ordering and review of radiographic studies, re-evaluation of patient's condition, pulse oximetry and review of old charts.        Labs Reviewed   CBC W/ AUTO DIFFERENTIAL - Abnormal; Notable for the following components:       Result Value    Hemoglobin 13.9 (*)     Gran% 78.0 (*)     Lymph% 13.8 (*)     All other components within normal limits   COMPREHENSIVE METABOLIC PANEL - Abnormal; Notable for the following components:    Potassium 3.3 (*)     Glucose 116 (*)     BUN, Bld 39 (*)     Creatinine 3.7 (*)     Albumin 3.1 (*)     eGFR if  21 (*)     eGFR if non  18 (*)     All other components within normal limits   TROPONIN I - Abnormal; Notable for the following components:    Troponin I 0.456 (*)     All other components within normal limits   B-TYPE NATRIURETIC PEPTIDE - Abnormal; Notable for the following components:    BNP 2,792 (*)     All other components within normal limits   CULTURE, BLOOD   CULTURE, BLOOD   RESPIRATORY VIRAL PANEL BY PCR   LACTIC ACID, PLASMA   D DIMER, QUANTITATIVE   D DIMER, QUANTITATIVE          Imaging Results          X-Ray Chest PA And Lateral (Final result)  Result time 11/26/19 13:14:36    Final result by Mitesh Parks DO (11/26/19 13:14:36)                 Impression:      1. Bibasilar opacities consistent with multifocal pneumonia.  2. Cardiomegaly.      Electronically signed by: Mitesh Parks  Date:    11/26/2019  Time:    13:14             Narrative:    EXAMINATION:  XR CHEST PA AND LATERAL    CLINICAL HISTORY:  Shortness of Breath;    TECHNIQUE:  PA and lateral chest radiographs.    COMPARISON:  None available.    FINDINGS:  The lungs are well expanded.   There are patchy airspace opacities within bilateral lower lungs.  Pleural spaces are clear.  The cardiac silhouette is enlarged.  Visualized osseous structures are normal.                                 Medical Decision Making:   Initial Assessment:   44-year-old male with a history of hypertension presents with 2-3 weeks of not feeling well and cough.  Was seen at urgent care earlier today and x-ray showed findings concerning for multifocal pneumonia.  Sent here for further evaluation.  His vital signs are notable for tachycardia and hypertension.  He did take his medications earlier today.  He denies vaping or E cigarettes but is a smoker.  Chest x-ray here also shows evidence of multifocal pneumonia.  Blood cultures drawn and empiric antibiotics started.  Labs notable for elevated troponin at 0.4.  BNP also greater than 2000.  Metabolic panel shows a creatinine of 3.7.  CBC unremarkable.  Aspirin given.  Patient will be admitted for further evaluation.                                   Clinical Impression:       ICD-10-CM ICD-9-CM   1. Heart failure, unspecified HF chronicity, unspecified heart failure type I50.9 428.9   2. Shortness of breath R06.02 786.05   3. SOB (shortness of breath) R06.02 786.05   4. Acute renal failure superimposed on chronic kidney disease, unspecified CKD stage, unspecified acute renal failure type N17.9 584.9    N18.9 585.9            I, Blayne Hernandez MD, personally performed the services described in this documentation. All medical record entries made by the scribe were at my direction and in my presence. I have reviewed the chart and agree that the record reflects my personal performance and is accurate and complete.                    Blayne Hernandez MD  11/26/19 3327

## 2019-11-26 NOTE — ASSESSMENT & PLAN NOTE
Pulmonary edema vs pneumonia  QTc is prolonged therefore will hold off on azithromycin  Start doxycycline empirically  Will discuss diuresis with nephrology  Resp viral panel ordered. Results are pending  Symptomatic treatment

## 2019-11-26 NOTE — HPI
"44 year old male with hypertension, chronic kidney disease and who is a cigar smoker who presented with progressive shortness of breath of 1.5 weeks in evolution. Associated symptoms include cough, chills, lower extremity swelling and chest pain with coughing. Denied fever or sick contacts. 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 He was seen at urgent care today. Had a chest ray obtained and was told to come here for evaluation of "multifocal pneumonia". Only medication he takes at home is verapamil. Used to be on losartan but taken off due to recall. Is allergic to lisinopril.    In the ED, patient was hypertensive. Afebrile and speaking mid sentences but with adequate sats at room air and in no distress. Labwork significant for D-Dimer 0.76, BNP 2792, trop 0.456 and EKG with prolonged QTc, T wave inversion V2/V6. Patient admitted to hospital medicine for possible viral syndrome vs bacterial pneumonia, acute renal failure and Cardiology evaluation.   "

## 2019-11-27 PROBLEM — R05.8 NON-PRODUCTIVE COUGH: Status: RESOLVED | Noted: 2019-11-26 | Resolved: 2019-11-27

## 2019-11-27 PROBLEM — R60.0 LOWER EXTREMITY EDEMA: Status: RESOLVED | Noted: 2019-11-26 | Resolved: 2019-11-27

## 2019-11-27 PROBLEM — B34.9 ACUTE VIRAL SYNDROME: Status: RESOLVED | Noted: 2019-11-26 | Resolved: 2019-11-27

## 2019-11-27 PROBLEM — R68.83 CHILLS (WITHOUT FEVER): Status: RESOLVED | Noted: 2019-11-26 | Resolved: 2019-11-27

## 2019-11-27 LAB
ALBUMIN SERPL BCP-MCNC: 3 G/DL (ref 3.5–5.2)
ALP SERPL-CCNC: 53 U/L (ref 55–135)
ALT SERPL W/O P-5'-P-CCNC: 37 U/L (ref 10–44)
ANION GAP SERPL CALC-SCNC: 12 MMOL/L (ref 8–16)
AST SERPL-CCNC: 32 U/L (ref 10–40)
BASOPHILS # BLD AUTO: 0.06 K/UL (ref 0–0.2)
BASOPHILS NFR BLD: 0.8 % (ref 0–1.9)
BILIRUB SERPL-MCNC: 0.9 MG/DL (ref 0.1–1)
BUN SERPL-MCNC: 38 MG/DL (ref 6–20)
CALCIUM SERPL-MCNC: 9 MG/DL (ref 8.7–10.5)
CHLORIDE SERPL-SCNC: 105 MMOL/L (ref 95–110)
CHOLEST SERPL-MCNC: 167 MG/DL (ref 120–199)
CHOLEST/HDLC SERPL: 2.7 {RATIO} (ref 2–5)
CO2 SERPL-SCNC: 25 MMOL/L (ref 23–29)
CREAT SERPL-MCNC: 3.6 MG/DL (ref 0.5–1.4)
DIFFERENTIAL METHOD: ABNORMAL
EOSINOPHIL # BLD AUTO: 0.2 K/UL (ref 0–0.5)
EOSINOPHIL NFR BLD: 3.2 % (ref 0–8)
ERYTHROCYTE [DISTWIDTH] IN BLOOD BY AUTOMATED COUNT: 13.2 % (ref 11.5–14.5)
EST. GFR  (AFRICAN AMERICAN): 22 ML/MIN/1.73 M^2
EST. GFR  (NON AFRICAN AMERICAN): 19 ML/MIN/1.73 M^2
GLUCOSE SERPL-MCNC: 107 MG/DL (ref 70–110)
HCT VFR BLD AUTO: 40.9 % (ref 40–54)
HDLC SERPL-MCNC: 62 MG/DL (ref 40–75)
HDLC SERPL: 37.1 % (ref 20–50)
HGB BLD-MCNC: 13.9 G/DL (ref 14–18)
IMM GRANULOCYTES # BLD AUTO: 0.02 K/UL (ref 0–0.04)
IMM GRANULOCYTES NFR BLD AUTO: 0.3 % (ref 0–0.5)
LDLC SERPL CALC-MCNC: 83.2 MG/DL (ref 63–159)
LYMPHOCYTES # BLD AUTO: 1.1 K/UL (ref 1–4.8)
LYMPHOCYTES NFR BLD: 14.7 % (ref 18–48)
MAGNESIUM SERPL-MCNC: 1.6 MG/DL (ref 1.6–2.6)
MCH RBC QN AUTO: 30 PG (ref 27–31)
MCHC RBC AUTO-ENTMCNC: 34 G/DL (ref 32–36)
MCV RBC AUTO: 88 FL (ref 82–98)
MONOCYTES # BLD AUTO: 0.7 K/UL (ref 0.3–1)
MONOCYTES NFR BLD: 8.6 % (ref 4–15)
NEUTROPHILS # BLD AUTO: 5.5 K/UL (ref 1.8–7.7)
NEUTROPHILS NFR BLD: 72.4 % (ref 38–73)
NONHDLC SERPL-MCNC: 105 MG/DL
NRBC BLD-RTO: 0 /100 WBC
PLATELET # BLD AUTO: 233 K/UL (ref 150–350)
PMV BLD AUTO: 11.5 FL (ref 9.2–12.9)
POTASSIUM SERPL-SCNC: 3.2 MMOL/L (ref 3.5–5.1)
PROT SERPL-MCNC: 6.1 G/DL (ref 6–8.4)
RBC # BLD AUTO: 4.64 M/UL (ref 4.6–6.2)
SODIUM SERPL-SCNC: 142 MMOL/L (ref 136–145)
TRIGL SERPL-MCNC: 109 MG/DL (ref 30–150)
TROPONIN I SERPL DL<=0.01 NG/ML-MCNC: 0.46 NG/ML (ref 0–0.03)
TSH SERPL DL<=0.005 MIU/L-ACNC: 0.53 UIU/ML (ref 0.4–4)
WBC # BLD AUTO: 7.56 K/UL (ref 3.9–12.7)

## 2019-11-27 PROCEDURE — 63600175 PHARM REV CODE 636 W HCPCS: Performed by: INTERNAL MEDICINE

## 2019-11-27 PROCEDURE — 85025 COMPLETE CBC W/AUTO DIFF WBC: CPT

## 2019-11-27 PROCEDURE — 99291 PR CRITICAL CARE, E/M 30-74 MINUTES: ICD-10-PCS | Mod: ,,, | Performed by: INTERNAL MEDICINE

## 2019-11-27 PROCEDURE — 80053 COMPREHEN METABOLIC PANEL: CPT

## 2019-11-27 PROCEDURE — 84443 ASSAY THYROID STIM HORMONE: CPT

## 2019-11-27 PROCEDURE — 25000003 PHARM REV CODE 250: Performed by: INTERNAL MEDICINE

## 2019-11-27 PROCEDURE — 84484 ASSAY OF TROPONIN QUANT: CPT

## 2019-11-27 PROCEDURE — 99291 CRITICAL CARE FIRST HOUR: CPT | Mod: ,,, | Performed by: INTERNAL MEDICINE

## 2019-11-27 PROCEDURE — 21400001 HC TELEMETRY ROOM

## 2019-11-27 PROCEDURE — 25000003 PHARM REV CODE 250: Performed by: HOSPITALIST

## 2019-11-27 PROCEDURE — 83735 ASSAY OF MAGNESIUM: CPT

## 2019-11-27 PROCEDURE — 80061 LIPID PANEL: CPT

## 2019-11-27 RX ORDER — FUROSEMIDE 10 MG/ML
40 INJECTION INTRAMUSCULAR; INTRAVENOUS 2 TIMES DAILY
Status: DISCONTINUED | OUTPATIENT
Start: 2019-11-27 | End: 2019-11-28

## 2019-11-27 RX ORDER — ONDANSETRON 2 MG/ML
8 INJECTION INTRAMUSCULAR; INTRAVENOUS EVERY 6 HOURS PRN
Status: DISCONTINUED | OUTPATIENT
Start: 2019-11-27 | End: 2019-11-30 | Stop reason: HOSPADM

## 2019-11-27 RX ORDER — ACETAMINOPHEN 325 MG/1
650 TABLET ORAL EVERY 4 HOURS PRN
Status: DISCONTINUED | OUTPATIENT
Start: 2019-11-27 | End: 2019-11-30 | Stop reason: HOSPADM

## 2019-11-27 RX ORDER — HYDRALAZINE HYDROCHLORIDE 25 MG/1
75 TABLET, FILM COATED ORAL EVERY 8 HOURS
Status: DISCONTINUED | OUTPATIENT
Start: 2019-11-27 | End: 2019-11-28

## 2019-11-27 RX ORDER — ATORVASTATIN CALCIUM 10 MG/1
10 TABLET, FILM COATED ORAL NIGHTLY
Status: DISCONTINUED | OUTPATIENT
Start: 2019-11-27 | End: 2019-11-30 | Stop reason: HOSPADM

## 2019-11-27 RX ORDER — CLONIDINE HYDROCHLORIDE 0.1 MG/1
0.2 TABLET ORAL EVERY 8 HOURS PRN
Status: DISCONTINUED | OUTPATIENT
Start: 2019-11-27 | End: 2019-11-29

## 2019-11-27 RX ORDER — CARVEDILOL 3.12 MG/1
3.12 TABLET ORAL 2 TIMES DAILY
Status: DISCONTINUED | OUTPATIENT
Start: 2019-11-27 | End: 2019-11-28

## 2019-11-27 RX ORDER — POLYETHYLENE GLYCOL 3350 17 G/17G
17 POWDER, FOR SOLUTION ORAL 2 TIMES DAILY PRN
Status: DISCONTINUED | OUTPATIENT
Start: 2019-11-27 | End: 2019-11-30 | Stop reason: HOSPADM

## 2019-11-27 RX ORDER — POTASSIUM CHLORIDE 20 MEQ/1
40 TABLET, EXTENDED RELEASE ORAL ONCE
Status: COMPLETED | OUTPATIENT
Start: 2019-11-27 | End: 2019-11-27

## 2019-11-27 RX ADMIN — HYDRALAZINE HYDROCHLORIDE 75 MG: 25 TABLET, FILM COATED ORAL at 09:11

## 2019-11-27 RX ADMIN — ISOSORBIDE MONONITRATE 30 MG: 30 TABLET, EXTENDED RELEASE ORAL at 08:11

## 2019-11-27 RX ADMIN — CLONIDINE HYDROCHLORIDE 0.2 MG: 0.1 TABLET ORAL at 04:11

## 2019-11-27 RX ADMIN — HYDRALAZINE HYDROCHLORIDE 50 MG: 25 TABLET ORAL at 05:11

## 2019-11-27 RX ADMIN — NICARDIPINE HYDROCHLORIDE 2.5 MG/HR: 0.2 INJECTION, SOLUTION INTRAVENOUS at 08:11

## 2019-11-27 RX ADMIN — FUROSEMIDE 40 MG: 10 INJECTION, SOLUTION INTRAVENOUS at 05:11

## 2019-11-27 RX ADMIN — CARVEDILOL 3.12 MG: 3.12 TABLET, FILM COATED ORAL at 09:11

## 2019-11-27 RX ADMIN — NICARDIPINE HYDROCHLORIDE 9 MG/HR: 0.2 INJECTION, SOLUTION INTRAVENOUS at 12:11

## 2019-11-27 RX ADMIN — ATORVASTATIN CALCIUM 10 MG: 10 TABLET, FILM COATED ORAL at 09:11

## 2019-11-27 RX ADMIN — HEPARIN SODIUM 5000 UNITS: 5000 INJECTION, SOLUTION INTRAVENOUS; SUBCUTANEOUS at 09:11

## 2019-11-27 RX ADMIN — POTASSIUM CHLORIDE 40 MEQ: 1500 TABLET, EXTENDED RELEASE ORAL at 12:11

## 2019-11-27 RX ADMIN — FUROSEMIDE 40 MG: 10 INJECTION, SOLUTION INTRAVENOUS at 08:11

## 2019-11-27 RX ADMIN — DOXYCYCLINE HYCLATE 100 MG: 100 TABLET, COATED ORAL at 08:11

## 2019-11-27 RX ADMIN — HYDRALAZINE HYDROCHLORIDE 75 MG: 25 TABLET, FILM COATED ORAL at 01:11

## 2019-11-27 RX ADMIN — CARVEDILOL 3.12 MG: 3.12 TABLET, FILM COATED ORAL at 10:11

## 2019-11-27 RX ADMIN — HEPARIN SODIUM 5000 UNITS: 5000 INJECTION, SOLUTION INTRAVENOUS; SUBCUTANEOUS at 08:11

## 2019-11-27 NOTE — CARE UPDATE
[facility]  ICU Multidisciplinary Bedside Rounds   SUMMARY     Date: 11/27/2019    Prehospitalization: Home  Admit Date / LOS : 11/26/2019/ 1 days    Diagnosis: Acute renal failure    Consults:        Active: Cardio and Nephro       Needed: SW or  for help with prescriptions      Code Status: Full Code   Advanced Directive: <no information>    LDA: PIV       Central Lines/Site/Justification:Pressors       Urinary Cath/Order/Justification:Patient Does Not Have Urinary Catheter    Vasopressors/Infusions:    niCARdipine 5 mg/hr (11/27/19 0401)          GOALS: Volume/ Hemodynamic: MAP<120                     RASS: 0  alert and calm    CAM ICU: N/A  Pain Management: none       Pain Controlled: not applicable     Rhythm: ST    Respiratory Device: Nasal Cannula                  Most Recent SBT/ SAT: Does not meet criteria       MOVE Screen: PASS    VTE Prophylaxis: Pharm  Mobility: Bedrest  Stress Ulcer Prophylaxis: Yes    Dietary: PO  Tolerance: yes  /  Advancement: no    Isolation: No active isolations    Restraints: No    Significant Dates:  Post Op Date: N/A  Rescue Date: N/A  Imaging/ Diagnostics: 11/26/19 BLE US: negative of a DVT    Noteworthy Labs:  none    CBC/Anemia Labs: Coags:    Recent Labs   Lab 11/26/19  1300 11/27/19 0223   WBC 8.02 7.56   HGB 13.9* 13.9*   HCT 41.7 40.9    233   MCV 90 88   RDW 13.3 13.2    No results for input(s): PT, INR, APTT in the last 168 hours.     Chemistries:   Recent Labs   Lab 11/26/19  1300 11/27/19 0223    142   K 3.3* 3.2*    105   CO2 23 25   BUN 39* 38*   CREATININE 3.7* 3.6*   CALCIUM 9.1 9.0   PROT 6.3 6.1   BILITOT 0.8 0.9   ALKPHOS 62 53*   ALT 33 37   AST 31 32   MG  --  1.6        Cardiac Enzymes: Ejection Fractions:    Recent Labs     11/26/19  1300 11/26/19 1946 11/27/19 0223   TROPONINI 0.456* 0.475* 0.463*    No results found for: EF     POCT Glucose: HbA1c:    No results for input(s): POCTGLUCOSE in the last  168 hours. No results found for: HGBA1C     Needs from Care Team: Start a regimen to assist with controlling patient's BP     ICU LOS 8h  Level of Care: OK to Transfer

## 2019-11-27 NOTE — ASSESSMENT & PLAN NOTE
Mgmt per renal  Likely cardiorenal synd  Avoid nephrotoxins  Despite neg >3L, creat actually decreased 3.7->3.6 today  Cont Lasix 40mg IV bid  Replete KCL

## 2019-11-27 NOTE — SUBJECTIVE & OBJECTIVE
Past Medical History:   Diagnosis Date    Cigarette smoker     Hypertension     Pneumonia 11/26/2019       Past Surgical History:   Procedure Laterality Date    skull fracture repair      in childhood       Review of patient's allergies indicates:   Allergen Reactions    Lisinopril (bulk) Other (See Comments)     Angioedema         No current facility-administered medications on file prior to encounter.      Current Outpatient Medications on File Prior to Encounter   Medication Sig    losartan (COZAAR) 100 MG tablet Take 100 mg by mouth once daily.    verapamil (CALAN-SR) 120 MG CR tablet Take 1 tablet (120 mg total) by mouth once daily.    cetirizine (ZYRTEC) 10 MG tablet Take 1 tablet (10 mg total) by mouth once daily. for 6 days    hydrALAZINE (APRESOLINE) 50 MG tablet Take 1 tablet (50 mg total) by mouth 3 (three) times daily.     Family History     None        Tobacco Use    Smoking status: Current Every Day Smoker     Packs/day: 0.25     Types: Cigars    Smokeless tobacco: Never Used   Substance and Sexual Activity    Alcohol use: Yes    Drug use: No    Sexual activity: Not on file     Review of Systems   Gastrointestinal: Negative for melena.   Genitourinary: Negative for hematuria.     Objective:     Vital Signs (Most Recent):  Temp: 98.7 °F (37.1 °C) (11/27/19 0715)  Pulse: 98 (11/27/19 0845)  Resp: 20 (11/27/19 0845)  BP: (!) 151/92 (11/27/19 0845)  SpO2: 97 % (11/27/19 0845) Vital Signs (24h Range):  Temp:  [98 °F (36.7 °C)-98.7 °F (37.1 °C)] 98.7 °F (37.1 °C)  Pulse:  [] 98  Resp:  [16-74] 20  SpO2:  [91 %-99 %] 97 %  BP: (117-198)/() 151/92     Weight: 88.9 kg (195 lb 15.8 oz)  Body mass index is 25.16 kg/m².    SpO2: 97 %  O2 Device (Oxygen Therapy): nasal cannula      Intake/Output Summary (Last 24 hours) at 11/27/2019 0913  Last data filed at 11/27/2019 0800  Gross per 24 hour   Intake 1150.85 ml   Output 4200 ml   Net -3049.15 ml       Lines/Drains/Airways     Peripheral  Intravenous Line                 Peripheral IV - Single Lumen 11/26/19 1318 18 G Right Antecubital less than 1 day         Peripheral IV - Single Lumen 11/26/19 1925 Anterior;Left Antecubital less than 1 day                Physical Exam   Constitutional: He is oriented to person, place, and time. He appears well-developed and well-nourished.   HENT:   Head: Normocephalic and atraumatic.   Eyes: Pupils are equal, round, and reactive to light. Conjunctivae and EOM are normal. No scleral icterus.   Neck: Normal range of motion. Neck supple. No JVD present. No tracheal deviation present. No thyromegaly present.   Cardiovascular: Normal rate, regular rhythm, S1 normal and S2 normal. Exam reveals no gallop and no friction rub.   No murmur heard.  Pulmonary/Chest: Effort normal. No respiratory distress. He has no wheezes. He has no rales. He exhibits no tenderness.   Abdominal: Soft. He exhibits no distension.   Musculoskeletal: Normal range of motion. He exhibits no edema.   Neurological: He is alert and oriented to person, place, and time. He has normal strength. No cranial nerve deficit.   Skin: Skin is warm and dry. No rash noted.   Psychiatric: He has a normal mood and affect. His behavior is normal.       Current Medications:   doxycycline  100 mg Oral Q12H    heparin (porcine)  5,000 Units Subcutaneous Q12H    hydrALAZINE  75 mg Oral Q8H    isosorbide mononitrate  30 mg Oral Daily      niCARdipine 2.5 mg/hr (11/27/19 0842)     acetaminophen, influenza, ondansetron, pneumoc 13-anaid conj-dip cr(PF), polyethylene glycol, sodium chloride 0.9%    Laboratory (all labs reviewed):  CBC:  Recent Labs   Lab 03/05/19 1940 11/26/19  1300 11/27/19  0223   WBC 7.33 8.02 7.56   Hemoglobin 14.8 13.9 L 13.9 L   Hematocrit 45.5 41.7 40.9   Platelets 252 233 233       CHEMISTRIES:  Recent Labs   Lab 03/05/19 1940 11/26/19  1300 11/27/19  0223   Glucose 76 116 H 107   Sodium 140 141 142   Potassium 3.9 3.3 L 3.2 L   BUN, Bld 23 H  39 H 38 H   Creatinine 2.3 H 3.7 H 3.6 H   eGFR if  38 A 21 A 22 A   eGFR if non  33 A 18 A 19 A   Calcium 9.9 9.1 9.0   Magnesium  --   --  1.6       CARDIAC BIOMARKERS:  Recent Labs   Lab 11/26/19  1300 11/26/19 1946 11/27/19  0223   Troponin I 0.456 H 0.475 H 0.463 H       COAGS:  Recent Labs   Lab 03/05/19 1940   INR 1.0       LIPIDS/LFTS:  Recent Labs   Lab 03/05/19 1940 11/26/19  1300 11/27/19  0223   Cholesterol  --   --  167   Triglycerides  --   --  109   HDL  --   --  62   LDL Cholesterol  --   --  83.2   Non-HDL Cholesterol  --   --  105   AST 22 31 32   ALT 17 33 37       BNP:  Recent Labs   Lab 11/26/19  1300   BNP 2,792 H       TSH:        Free T4:        Diagnostic Results:  ECG (personally reviewed and interpreted tracing(s)):  11/26/19 1255 , LAD, NSSTTW changes    Chest X-Ray (personally reviewed and interpreted image(s)): 11/26/19 ?bilat PNA vs bat[place] pulm edema    LE venous US 11/26/19  No evidence of deep venous thrombosis in either lower extremity.    Echo: 11/26/19 (images personally reviewed and interpreted)  · Severely decreased left ventricular systolic function. The estimated ejection fraction is 15%  · Severe global hypokinetic wall motion.  · Moderate concentric left ventricular hypertrophy.  · Mild left ventricular enlargement.  · Grade III (severe) left ventricular diastolic dysfunction consistent with restrictive physiology.  · The sinuses of Valsalva is moderately dilated. 4.5cm.  · Mildly reduced right ventricular systolic function.  · Mild mitral regurgitation.  · The estimated PA systolic pressure is 34 mm Hg

## 2019-11-27 NOTE — CARE UPDATE
Called by nurse about diastolic blood pressure remaining significantly high in the 120s-140s and systolic in the 180s despite oral antihypertensives. Echo results are available and showed LVEF of 15% which is severely depressed.Blood pressure will be difficult to manage with intermittent medications. Agree with nursing staff and house supervisor about transferring patient to ICU for nicardipine infusion pending better BP control with oral meds.

## 2019-11-27 NOTE — HOSPITAL COURSE
45 y/o male admitted with acute systolic and diastolic heart failure.  Echo with EF of 15%. This is a new diagnosis.  Started on IV Lasix.  Uncontrolled BP and sent to ICU on Cardene gtt.  Hydralazine dose increased and started on Coreg.  Able to wean off Cardene gtt.  Patient also with worsening renal failure.  Cardiology recommending life vest. Stepped down to telemetry floor. Breathing comfortably at room air, independent and ambulatory but blood pressure very difficult to control. Antihypertensives regimen adjusted. Blood pressure finally better controlled on multiple antihypertensives. Patient got very anxious and requested discharge. Renal function remained unpredictable while on diuretics and new antihypertensive therapy therefore recommended against this. Patient also pending insurance. Given lack of insurance, medication regimen was limited to atorvastatin, carvedilol, furosemide and max dose nifedipine. Per [facility], patient is to pay $60 with applied discount. Patient agreed with this. Renal function remained stable at stage 4-5 (average Cr of 3.8) for 3 days. Life vest recommended. Patient did not want to wait in the hospital any longer and was discharged without life vest. Hopefully he allow for fitting at home. Is aware of risk for fatal arrhythmia. Cardiac/renal diet. Activity as tolerated.

## 2019-11-27 NOTE — PROGRESS NOTES
"EICU    Pt is a 45 y/o m with pmhx sig for htn, CKD (3/19 creat  2.3) in [facility] until 8 days ago when he developed cough and sob with exertion went to urgent care and was referred to ER due to cxr concerning for multifocal pna found to have elevated BNP and trop 0.4, creat 3.7.  Pt denies exertional cp, some issues with supine posture, denies illicit drug use, drinks etoh though not daily, no new medications, no recent travel.  Mother has htn, and "some heart disease."  Pt  Also reports lower ext edema prior to admission. On verapamil and hydralazine is on his outpt med list though pt does not mention taking it, also was on losartan in the past- d/gillian bc recall; lisinopril led to angiedema-liike reaction.  Pt initially was admitted to a floor bed, transferred to the ICU due to sig elevated bp and cardene gtt was initiated    pe 97% nc 138/84 hr 97 sinus rr 21  Wd wn m lying in no distress nonlabored breathing alert and oriented to person/place/time, wd/wn  speakes easily lying at approx 45 degrees  Exam was described as having no wheezing/rales, +suki R>L  Ext warm no rash no mottling described    Na 141 K 3.3 HCO3 23 ag 13 bun 39 creat 3.7 ca 9.1 gluc 116  ast 31 alt 33 alk phos 62 tb 0.8 tp 6.3 alb 3.1  Wbc 8 P 78 L 13  hgb 13.9 mcv 90  plt 233    ua sg 1.01 neg nit/le 2 rbc 1 wbc 1+ blood  dnp 2792 trop 0.456 --> 0.475  Lac 1.5    tte lvsf 15% global hypokinesis, mod LVH, no sig valvular dz, PAS 34    ekg stach 108 nl ax , lad   cxr enlarged card silhouette bilat airspace infiltrates central with indistinct gabriel, ?right pleural effusion    Le ext dopplers neg for dvt in both legs    A/P  Systolic Heart Failure/Acute on CKD  Very likely end stage htnive heart disease, but differential remains and certainly CAD will need to be ruled out  - cont cardene for now, bp appears better controlled; sinus rhythm; diurese per cards notes, hydral/imdur added for afterload  - nonlabored breathing sao2 ok on minimall supp " O2; consider NIPP if incr wob plus will help with afterload reduction/hypoxia  - creat increase likely cardiorenal in origin, bu thas baseline CKD based on 3/19 labs, ua unremarkable; will replete K  With oral K, repeat K and check Mg  - subq hep  - oob to chair  - card following and renal has been consulted; cardiac cath would be ideal after diuresis, but kidney function makes this a concern due to arterial dye load, and global hypokinesis with no compelling hx makes CAD less likely  - discussed with pt importance of this diagnosis and need to follow up judiciously

## 2019-11-27 NOTE — PROGRESS NOTES
[facility] [place]  Cardiology  Progress Note    Patient Name: Timo Youssef  MRN: [id number]  Admission Date: 11/26/2019  Hospital Length of Stay: 1 days  Code Status: Full Code   Attending Physician: Ander Carrillo MD   Primary Care Physician: Primary Doctor No  Expected Discharge Date:   Principal Problem:Acute renal failure    Subjective:     Hospital Course:   11/26/19: adm with ?htn urg and ?cardiorenal synd    Interval Hx: pt seen in ICU, case d/w RN and Dr. Carrillo.  No cp, sob much improved.  No palps/LH.  Importance of compliance with med rx and further w/u CMP d/w pt.  Also plan to order lifevest and importance of wearing device stressed to pt.    Tele: SR 90s (personally reviewed and interpreted)      Past Medical History:   Diagnosis Date    Cigarette smoker     Hypertension     Pneumonia 11/26/2019       Past Surgical History:   Procedure Laterality Date    skull fracture repair      in childhood       Review of patient's allergies indicates:   Allergen Reactions    Lisinopril (bulk) Other (See Comments)     Angioedema         No current facility-administered medications on file prior to encounter.      Current Outpatient Medications on File Prior to Encounter   Medication Sig    losartan (COZAAR) 100 MG tablet Take 100 mg by mouth once daily.    verapamil (CALAN-SR) 120 MG CR tablet Take 1 tablet (120 mg total) by mouth once daily.    cetirizine (ZYRTEC) 10 MG tablet Take 1 tablet (10 mg total) by mouth once daily. for 6 days    hydrALAZINE (APRESOLINE) 50 MG tablet Take 1 tablet (50 mg total) by mouth 3 (three) times daily.     Family History     None        Tobacco Use    Smoking status: Current Every Day Smoker     Packs/day: 0.25     Types: Cigars    Smokeless tobacco: Never Used   Substance and Sexual Activity    Alcohol use: Yes    Drug use: No    Sexual activity: Not on file     Review of Systems   Gastrointestinal: Negative for melena.   Genitourinary: Negative for  hematuria.     Objective:     Vital Signs (Most Recent):  Temp: 98.7 °F (37.1 °C) (11/27/19 0715)  Pulse: 98 (11/27/19 0845)  Resp: 20 (11/27/19 0845)  BP: (!) 151/92 (11/27/19 0845)  SpO2: 97 % (11/27/19 0845) Vital Signs (24h Range):  Temp:  [98 °F (36.7 °C)-98.7 °F (37.1 °C)] 98.7 °F (37.1 °C)  Pulse:  [] 98  Resp:  [16-74] 20  SpO2:  [91 %-99 %] 97 %  BP: (117-198)/() 151/92     Weight: 88.9 kg (195 lb 15.8 oz)  Body mass index is 25.16 kg/m².    SpO2: 97 %  O2 Device (Oxygen Therapy): nasal cannula      Intake/Output Summary (Last 24 hours) at 11/27/2019 0913  Last data filed at 11/27/2019 0800  Gross per 24 hour   Intake 1150.85 ml   Output 4200 ml   Net -3049.15 ml       Lines/Drains/Airways     Peripheral Intravenous Line                 Peripheral IV - Single Lumen 11/26/19 1318 18 G Right Antecubital less than 1 day         Peripheral IV - Single Lumen 11/26/19 1925 Anterior;Left Antecubital less than 1 day                Physical Exam   Constitutional: He is oriented to person, place, and time. He appears well-developed and well-nourished.   HENT:   Head: Normocephalic and atraumatic.   Eyes: Pupils are equal, round, and reactive to light. Conjunctivae and EOM are normal. No scleral icterus.   Neck: Normal range of motion. Neck supple. No JVD present. No tracheal deviation present. No thyromegaly present.   Cardiovascular: Normal rate, regular rhythm, S1 normal and S2 normal. Exam reveals no gallop and no friction rub.   No murmur heard.  Pulmonary/Chest: Effort normal. No respiratory distress. He has no wheezes. He has no rales. He exhibits no tenderness.   Abdominal: Soft. He exhibits no distension.   Musculoskeletal: Normal range of motion. He exhibits no edema.   Neurological: He is alert and oriented to person, place, and time. He has normal strength. No cranial nerve deficit.   Skin: Skin is warm and dry. No rash noted.   Psychiatric: He has a normal mood and affect. His behavior is  normal.       Current Medications:   doxycycline  100 mg Oral Q12H    heparin (porcine)  5,000 Units Subcutaneous Q12H    hydrALAZINE  75 mg Oral Q8H    isosorbide mononitrate  30 mg Oral Daily      niCARdipine 2.5 mg/hr (11/27/19 0842)     acetaminophen, influenza, ondansetron, pneumoc 13-anaid conj-dip cr(PF), polyethylene glycol, sodium chloride 0.9%    Laboratory (all labs reviewed):  CBC:  Recent Labs   Lab 03/05/19 1940 11/26/19  1300 11/27/19  0223   WBC 7.33 8.02 7.56   Hemoglobin 14.8 13.9 L 13.9 L   Hematocrit 45.5 41.7 40.9   Platelets 252 233 233       CHEMISTRIES:  Recent Labs   Lab 03/05/19 1940 11/26/19  1300 11/27/19  0223   Glucose 76 116 H 107   Sodium 140 141 142   Potassium 3.9 3.3 L 3.2 L   BUN, Bld 23 H 39 H 38 H   Creatinine 2.3 H 3.7 H 3.6 H   eGFR if  38 A 21 A 22 A   eGFR if non  33 A 18 A 19 A   Calcium 9.9 9.1 9.0   Magnesium  --   --  1.6       CARDIAC BIOMARKERS:  Recent Labs   Lab 11/26/19  1300 11/26/19 1946 11/27/19 0223   Troponin I 0.456 H 0.475 H 0.463 H       COAGS:  Recent Labs   Lab 03/05/19 1940   INR 1.0       LIPIDS/LFTS:  Recent Labs   Lab 03/05/19 1940 11/26/19  1300 11/27/19  0223   Cholesterol  --   --  167   Triglycerides  --   --  109   HDL  --   --  62   LDL Cholesterol  --   --  83.2   Non-HDL Cholesterol  --   --  105   AST 22 31 32   ALT 17 33 37       BNP:  Recent Labs   Lab 11/26/19  1300   BNP 2,792 H       TSH:        Free T4:        Diagnostic Results:  ECG (personally reviewed and interpreted tracing(s)):  11/26/19 1255 , LAD, NSSTTW changes    Chest X-Ray (personally reviewed and interpreted image(s)): 11/26/19 ?bilat PNA vs [place] pulm edema    LE venous US 11/26/19  No evidence of deep venous thrombosis in either lower extremity.    Echo: 11/26/19 (images personally reviewed and interpreted)  · Severely decreased left ventricular systolic function. The estimated ejection fraction is 15%  · Severe global  hypokinetic wall motion.  · Moderate concentric left ventricular hypertrophy.  · Mild left ventricular enlargement.  · Grade III (severe) left ventricular diastolic dysfunction consistent with restrictive physiology.  · The sinuses of Valsalva is moderately dilated. 4.5cm.  · Mildly reduced right ventricular systolic function.  · Mild mitral regurgitation.  · The estimated PA systolic pressure is 34 mm Hg          Assessment and Plan:     * Acute renal failure  Mgmt per renal  Likely cardiorenal synd  Avoid nephrotoxins  Despite neg >3L, creat actually decreased 3.7->3.6 today  Cont Lasix 40mg IV bid  Replete KCL    Dilated cardiomyopathy  EF 15%, global HK on echo  ?etiology, ddx wide at this point  Current sxs of cough/SOB/edema likely d/t vol overload, much improved with  Stop cardene infusion  Titrate hydrala/Imdur as BP will allow  Start coreg 3.125mg bid and titrate as HR/BP will allow  Cont IV lasix 40mg bid  Goal net neg 1-1.5L daily  Eventual isch eval (MPI vs cath) pending clinical course (will need significant improvement in renal fxn to consider cath).  Lifevest ordered    Shortness of breath  As per DCM section    Lower extremity edema  R>L  LE venous US neg  As per DCM section    Chronic kidney disease, stage 3  Creat 2.3 in 3/3019, now 3.7->3.6 today.  Neph eval planned  Cont diuresis  Start atorva 10mg qd  Check lipids/LFT 3 months (late Feb 2020)    Elevated troponin I level  Likely demand/CHF/CKD  No anginal sxs, doubt ACS    Essential hypertension  Med rx as above  Cont hydral/imdur  Start coreg        VTE Risk Mitigation (From admission, onward)         Ordered     heparin (porcine) injection 5,000 Units  Every 12 hours      11/26/19 1531     Place HARSHIL hose  Until discontinued      11/26/19 1530     Place sequential compression device  Until discontinued      11/26/19 1530              Pt seen in ICU, critical care time 35min.  OK for transfer to telemetry.    Alber Mora,  MD  Cardiology  [facility]

## 2019-11-27 NOTE — PLAN OF CARE
Cardene gtt turned off early in AM. BP controlled with oral meds. PRN clonidine given once. Doppler US of ABD/Retroperitoneal done today. Pt tolerating meals. VSS on room air. Voiding per urinal. No falls, injuries, or skin breakdown. Pt with orders to go to med-tele.

## 2019-11-27 NOTE — ASSESSMENT & PLAN NOTE
EF 15%, global HK on echo  ?etiology, ddx wide at this point  Current sxs of cough/SOB/edema likely d/t vol overload, much improved with  Stop cardene infusion  Titrate hydrala/Imdur as BP will allow  Start coreg 3.125mg bid and titrate as HR/BP will allow  Cont IV lasix 40mg bid  Goal net neg 1-1.5L daily  Eventual isch eval (MPI vs cath) pending clinical course (will need significant improvement in renal fxn to consider cath).  Lifevest ordered

## 2019-11-27 NOTE — ASSESSMENT & PLAN NOTE
Creat 2.3 in 3/3019, now 3.7->3.6 today.  Neph eval planned  Cont diuresis  Start atorva 10mg qd  Check lipids/LFT 3 months (late Feb 2020)

## 2019-11-27 NOTE — CARE UPDATE
[facility]  ICU Multidisciplinary Bedside Rounds     UPDATE     Date: 11/27/2019      Plan of care reviewed with the following, Nurse, Charge Nurse, Physician and Infection Prevention.       Needs/ Goals for the day: Turn off Cardene gtt. US with doppler of ABD and Retroperioneal.       Level of Care: Critical Care

## 2019-11-27 NOTE — SUBJECTIVE & OBJECTIVE
Interval History: Feeling better with no new complaints.    Review of Systems   HENT: Negative for ear discharge and ear pain.    Eyes: Negative for pain and itching.   Endocrine: Negative for polyphagia and polyuria.   Neurological: Negative for seizures and syncope.     Objective:     Vital Signs (Most Recent):  Temp: 98.1 °F (36.7 °C) (11/27/19 1101)  Pulse: 89 (11/27/19 1430)  Resp: 20 (11/27/19 1430)  BP: (!) 147/103 (11/27/19 1430)  SpO2: 99 % (11/27/19 1430) Vital Signs (24h Range):  Temp:  [98 °F (36.7 °C)-98.7 °F (37.1 °C)] 98.1 °F (36.7 °C)  Pulse:  [] 89  Resp:  [15-74] 20  SpO2:  [91 %-99 %] 99 %  BP: (117-196)/() 147/103     Weight: 88.9 kg (195 lb 15.8 oz)  Body mass index is 25.16 kg/m².    Intake/Output Summary (Last 24 hours) at 11/27/2019 1456  Last data filed at 11/27/2019 1145  Gross per 24 hour   Intake 1188.35 ml   Output 5200 ml   Net -4011.65 ml      Physical Exam   Constitutional: He is oriented to person, place, and time. He appears well-developed. No distress.   HENT:   Head: Normocephalic and atraumatic.   Eyes: Conjunctivae are normal.   Neck: Normal range of motion.   Cardiovascular: Normal rate and regular rhythm.   Pulmonary/Chest: Effort normal. He has no wheezes. He has no rales.   Abdominal: Soft.   Musculoskeletal: He exhibits edema (+ 1 BLE).   Neurological: He is alert and oriented to person, place, and time.   Skin: Skin is warm and dry. Capillary refill takes 2 to 3 seconds. He is not diaphoretic.   Psychiatric: He has a normal mood and affect. His behavior is normal. Judgment and thought content normal.   Nursing note and vitals reviewed.      Significant Labs:   BMP:   Recent Labs   Lab 11/27/19  0223         K 3.2*      CO2 25   BUN 38*   CREATININE 3.6*   CALCIUM 9.0   MG 1.6     CBC:   Recent Labs   Lab 11/26/19  1300 11/27/19  0223   WBC 8.02 7.56   HGB 13.9* 13.9*   HCT 41.7 40.9    233       Significant Imaging: I have reviewed all  pertinent imaging results/findings within the past 24 hours.

## 2019-11-27 NOTE — PLAN OF CARE
Patient admitted to ICU from telemetry floor for HTN. Cardene drip initiated in ICU and has been titrated throughout shift. Afebrile. Sinus tach on cardiac monitor. 2L nasal cannula. Voids per urinal. Renal/Cardiac diet. AAOx4. Consults to Cardiology and Renal. Patient aware of plan.

## 2019-11-27 NOTE — NURSING
Received report from emergency department regarding this pt. At time last /144.  Nurse stated that pt just received hydralizine po. Notified charge nurse of vitals - OC called charge nurse in ED and Dr. Mccann regarding admit of this patient to the floor.  ED stated that they would hold until BP got better.  Pt arrived on floor an hour later with last reported BP in ED at 174/118.  Did not receive any updates from ED regarding progress of this patient. Upon arrival, Pt BP was 195/141 on monitor, charge nurse notified and manual BP taken and it was shown to be 188/120.  Charge then called ED and Dr Mccann.  Nurse spoke with Dr Mccann and she asked when he received his hydralizine and was told that it was 2 hours ago.  She stated that we could give imdur now along with the IV lasix that was ordered.  Ultra sound was also called regarding stat order of BLE and kidneys.  Ultrasound stated that they would call for them soon.  5 minutes post call with Ramy, Lasix was given and pt went to Ultrasound.  Charge nurse informed.  Pt to ICU

## 2019-11-27 NOTE — HOSPITAL COURSE
11/26/19: adm with ?htn urg and ?cardiorenal synd    Interval Hx: feeling better, ambulating in room without difficulty.  No further SOB.  No cp/palps/LH.  LE edema resolved.  Importance of med rx compliance and diet modification stressed to pt.    Tele: SR 90s (personally reviewed and interpreted)

## 2019-11-27 NOTE — ASSESSMENT & PLAN NOTE
Unsure if acute on CKD stage 3 or worsening baseline Creat.  Possibly related to uncontrolled HTN.  Nephrology consulted.  Creat stable so far while on diuresis.  Avoid nephrotoxic medications.

## 2019-11-27 NOTE — PROGRESS NOTES
"[facility]  Progress Note    Patient Name: Timo Youssef  MRN: [id number]  Patient Class: IP- Inpatient   Admission Date: 11/26/2019  Length of Stay: 1 days  Attending Physician: Ander Carrillo MD  Primary Care Provider: Primary Doctor No        Subjective:     Principal Problem:Dilated cardiomyopathy        HPI:  44 year old male with hypertension, chronic kidney disease and who is a cigar smoker who presented with progressive shortness of breath of 1.5 weeks in evolution. Associated symptoms include cough, chills, lower extremity swelling and chest pain with coughing. Denied fever or sick contacts. 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 He was seen at urgent care today. Had a chest ray obtained and was told to come here for evaluation of "multifocal pneumonia". Only medication he takes at home is verapamil. Used to be on losartan but taken off due to recall. Is allergic to lisinopril.    In the ED, patient was hypertensive. Afebrile and speaking mid sentences but with adequate sats at room air and in no distress. Labwork significant for D-Dimer 0.76, BNP 2792, trop 0.456 and EKG with prolonged QTc, T wave inversion V2/V6. Patient admitted to hospital medicine for possible viral syndrome vs bacterial pneumonia, acute renal failure and Cardiology evaluation.     Overview/Hospital Course:  45 y/o male admitted with acute systolic and diastolic heart failure.  Echo with EF of 15%.  Started on IV Lasix.  Uncontrolled BP and sent to ICU on Cardene gtt.  Hydralazine dose increased and started on Coreg.  Able to wean off Cardene gtt.  Patient also with worsening renal failure.  Cardiology recommending life vest.    Interval History: Feeling better with no new complaints.    Review of Systems   HENT: Negative for ear discharge and ear pain.    Eyes: Negative for pain and itching.   Endocrine: Negative for polyphagia and " polyuria.   Neurological: Negative for seizures and syncope.     Objective:     Vital Signs (Most Recent):  Temp: 98.1 °F (36.7 °C) (11/27/19 1101)  Pulse: 89 (11/27/19 1430)  Resp: 20 (11/27/19 1430)  BP: (!) 147/103 (11/27/19 1430)  SpO2: 99 % (11/27/19 1430) Vital Signs (24h Range):  Temp:  [98 °F (36.7 °C)-98.7 °F (37.1 °C)] 98.1 °F (36.7 °C)  Pulse:  [] 89  Resp:  [15-74] 20  SpO2:  [91 %-99 %] 99 %  BP: (117-196)/() 147/103     Weight: 88.9 kg (195 lb 15.8 oz)  Body mass index is 25.16 kg/m².    Intake/Output Summary (Last 24 hours) at 11/27/2019 1456  Last data filed at 11/27/2019 1145  Gross per 24 hour   Intake 1188.35 ml   Output 5200 ml   Net -4011.65 ml      Physical Exam   Constitutional: He is oriented to person, place, and time. He appears well-developed. No distress.   HENT:   Head: Normocephalic and atraumatic.   Eyes: Conjunctivae are normal.   Neck: Normal range of motion.   Cardiovascular: Normal rate and regular rhythm.   Pulmonary/Chest: Effort normal. He has no wheezes. He has no rales.   Abdominal: Soft.   Musculoskeletal: He exhibits edema (+ 1 BLE).   Neurological: He is alert and oriented to person, place, and time.   Skin: Skin is warm and dry. Capillary refill takes 2 to 3 seconds. He is not diaphoretic.   Psychiatric: He has a normal mood and affect. His behavior is normal. Judgment and thought content normal.   Nursing note and vitals reviewed.      Significant Labs:   BMP:   Recent Labs   Lab 11/27/19 0223         K 3.2*      CO2 25   BUN 38*   CREATININE 3.6*   CALCIUM 9.0   MG 1.6     CBC:   Recent Labs   Lab 11/26/19  1300 11/27/19 0223   WBC 8.02 7.56   HGB 13.9* 13.9*   HCT 41.7 40.9    233       Significant Imaging: I have reviewed all pertinent imaging results/findings within the past 24 hours.      Assessment/Plan:      * Dilated cardiomyopathy  Acute combined systolic and diastolic heart failure.  Started on IV diuresis.  Appreciate  Cards input.  Unsure etiology.  Secondary to uncontrolled HTN?  Would need improvement of Creat for ischemic work up.  Malignant HTN and sent to ICU on Cardene gtt.  Increased dose of Hydralazine.  Continued Isosorbide and started on Coreg.  Able to wean off Cardene gtt.  Will need life vest upon discharge.        Essential hypertension  Malignant HTN as above.      Tobacco use disorder, severe, dependence  Spent > 5 minutes on smoking cessation education  Patient motivated to quit but not interested in nicotine patch at this time      Abnormal chest x-ray  Pulmonary edema  Afebrile with normal WBC.  Will stop Doxy      Elevated troponin I level  Elevated troponin 0.4 in setting of poor renal clearance  Chest pain free.      Chronic kidney disease, stage 3  Creat of 2.3 around 9 months ago.      Acute renal failure  Unsure if acute on CKD stage 3 or worsening baseline Creat.  Possibly related to uncontrolled HTN.  Nephrology consulted.  Creat stable so far while on diuresis.  Avoid nephrotoxic medications.        Shortness of breath  Secondary to CHF as above.        VTE Risk Mitigation (From admission, onward)         Ordered     heparin (porcine) injection 5,000 Units  Every 12 hours      11/26/19 1531     Place HARSHIL hose  Until discontinued      11/26/19 1530     Place sequential compression device  Until discontinued      11/26/19 1530                Critical care time spent on the evaluation and treatment of severe organ dysfunction, review of pertinent labs and imaging studies, discussions with consulting providers and discussions with patient/family: 45 minutes.      Ander Carrillo MD  Department of Hospital Medicine   [facility]

## 2019-11-27 NOTE — CONSULTS
"                                Renal ICU Consult    Date of Admission:  11/26/2019 12:47 PM        Chief Complaint:   Chief Complaint   Patient presents with    Shortness of Breath     x2 weeks of SOB, cough, cold, fever. was seen at  [facility] and was told after xray that he needs further eval for pneumonia. denies CP       HPI: 44 year old male with a PMHx. Significant for hypertension and chronic kidney disease (last creatinine was 2.3 back in March) Pte. Is uninsured and has not been followed by an MD for a while    Pte. presented to [facility] ED referred from Urgent Care yesterday with c.o. progressive shortness of breath for the past one and a half weeks.  Associated symptoms include cough, chills, lower extremity swelling and chest pain with coughing; Pte.  Denied fever or chills also no sick contacts. His CXR was consistent with "multifocal pneumonia".  Initial Labs. On admission revealed a serum creatinine of 3.7 therefore Consulted for GARRETT on CKD.    PMH:  Past Medical History:   Diagnosis Date    Cigarette smoker     Hypertension     Pneumonia 11/26/2019       PSH:  Past Surgical History:   Procedure Laterality Date    skull fracture repair      in childhood       Allergies:  Review of patient's allergies indicates:   Allergen Reactions    Lisinopril (bulk) Other (See Comments)     Angioedema         No current facility-administered medications on file prior to encounter.      Current Outpatient Medications on File Prior to Encounter   Medication Sig Dispense Refill    losartan (COZAAR) 100 MG tablet Take 100 mg by mouth once daily.      verapamil (CALAN-SR) 120 MG CR tablet Take 1 tablet (120 mg total) by mouth once daily. 30 tablet 3    cetirizine (ZYRTEC) 10 MG tablet Take 1 tablet (10 mg total) by mouth once daily. for 6 days  0    hydrALAZINE (APRESOLINE) 50 MG tablet Take 1 tablet (50 mg total) by mouth 3 (three) times daily. 90 tablet 1       Medications:  Current Facility-Administered Medications "   Medication Dose Route Frequency Provider Last Rate Last Dose    doxycycline tablet 100 mg  100 mg Oral Q12H Margie Soto MD        furosemide injection 40 mg  40 mg Intravenous BID Margie Soto MD   40 mg at 11/26/19 1755    heparin (porcine) injection 5,000 Units  5,000 Units Subcutaneous Q12H Margie Soto MD   5,000 Units at 11/26/19 2200    hydrALAZINE tablet 50 mg  50 mg Oral Q8H Margie Soto MD   50 mg at 11/27/19 0556    influenza (QUADRIVALENT PF) vaccine 0.5 mL  0.5 mL Intramuscular vaccine x 1 dose Margie Soto MD        isosorbide mononitrate 24 hr tablet 30 mg  30 mg Oral Daily Margie Soto MD        niCARdipine 40 mg/200 mL infusion  2.5 mg/hr Intravenous Continuous Margie Soto MD 12.5 mL/hr at 11/27/19 0700 2.5 mg/hr at 11/27/19 0700    pneumoc 13-anaid conj-dip cr(PF) (PREVNAR 13 (PF)) 0.5 mL  0.5 mL Intramuscular vaccine x 1 dose Margie Soto MD        sodium chloride 0.9% flush 10 mL  10 mL Intravenous PRN Margie Soto MD           FamHx:  History reviewed. No pertinent family history.    SocHx:  Social History     Socioeconomic History    Marital status: Single     Spouse name: Not on file    Number of children: Not on file    Years of education: Not on file    Highest education level: Not on file   Occupational History    Not on file   Social Needs    Financial resource strain: Not on file    Food insecurity:     Worry: Not on file     Inability: Not on file    Transportation needs:     Medical: Not on file     Non-medical: Not on file   Tobacco Use    Smoking status: Current Every Day Smoker     Packs/day: 0.25     Types: Cigars    Smokeless tobacco: Never Used   Substance and Sexual Activity    Alcohol use: Yes    Drug use: No    Sexual activity: Not on file   Lifestyle    Physical activity:     Days per week: Not on file     Minutes per session: Not on file    Stress: Not on file   Relationships    Social connections:      Talks on phone: Not on file     Gets together: Not on file     Attends Jain service: Not on file     Active member of club or organization: Not on file     Attends meetings of clubs or organizations: Not on file     Relationship status: Not on file   Other Topics Concern    Not on file   Social History Narrative    Not on file           Review of Systems: see H&P       Physical Exam:  Vitals:   Vitals:    11/27/19 0700   BP: (!) 137/92   Pulse: 92   Resp: 19   Temp:        I/O last 3 completed shifts:  In: 1138.4 [P.O.:720; I.V.:368.4; IV Piggyback:50]  Out: 3950 [Urine:3950]  No intake/output data recorded.    General: A&Ox3. No apparent distress.   Neck: supple no JVD  Lungs: CTA bilateral  Heart: RRR, S1-S2  Abdomen: Soft. NT.  : n/a  Ext: No edema  Skin: The skin is warm and dry. No jaundice.  Neurologic: Awake and alert, oriented x 3, no focal deficits      Laboratories:    Recent Labs   Lab 11/27/19 0223   WBC 7.56   RBC 4.64   HGB 13.9*   HCT 40.9      MCV 88   MCH 30.0   MCHC 34.0       Recent Labs   Lab 11/27/19 0223   CALCIUM 9.0   PROT 6.1      K 3.2*   CO2 25      BUN 38*   CREATININE 3.6*   ALKPHOS 53*   ALT 37   AST 32   BILITOT 0.9       Recent Labs   Lab 11/26/19  1557   COLORU Straw   SPECGRAV 1.010   PHUR 6.0   PROTEINUA 1+*   BACTERIA None       Microbiology Results (last 7 days)     Procedure Component Value Units Date/Time    Blood Culture #1 **CANNOT BE ORDERED STAT** [[id number]] Collected:  11/26/19 1300    Order Status:  Completed Specimen:  Blood from Peripheral, Antecubital, Right Updated:  11/26/19 2112     Blood Culture, Routine No Growth to date    Blood Culture #2 **CANNOT BE ORDERED STAT** [[id number]] Collected:  11/26/19 1307    Order Status:  Completed Specimen:  Blood from Peripheral, Hand, Right Updated:  11/26/19 2112     Blood Culture, Routine No Growth to date    Respiratory Viral Panel by PCR [facility]; Nasal Swab [[id number]] Collected:  11/26/19  1450    Order Status:  Sent Specimen:  Respiratory Updated:  11/26/19 1457            Diagnostic Tests:  US RETROPERITONEAL COMPLETE    FINDINGS:  Right kidney: The right kidney measures 10.0 cm. No cortical thinning.  There is diffuse parenchymal echogenicity.  Resistive index measures 0.66.  There is a subcentimeter simple appearing cyst within the upper pole.  No renal stone. No hydronephrosis.    Left kidney: The left kidney measures 10.0 cm. No cortical thinning. No loss of corticomedullary distinction.  There is increased cortical echogenicity.  Resistive index measures 0.60.  There is a subcentimeter septated cyst within the interpolar region.  No renal stone. No hydronephrosis.    The urinary bladder is unremarkable.  The prostate is enlarged.   Impression:       Bilateral increased renal cortical echogenicity, right greater than left.  Resistive indices are preserved.  Correlation with any history of chronic renal disease advised.     XR CHEST PA AND LATERAL    FINDINGS:  The lungs are well expanded.  There are patchy airspace opacities within bilateral lower lungs.  Pleural spaces are clear.  The cardiac silhouette is enlarged.  Visualized osseous structures are normal.   Impression:       1. Bibasilar opacities consistent with multifocal pneumonia.  2. Cardiomegaly.         Assessment:    43 y/o male admitted with c.o. Dyspnea:    - Abnormal CXR r/o PNA v.s. Fluid overload  - Uncontrolled HTN  - GARRETT on CKD-3 v.s. Progression of CKD based on Renal US report, likely hypertensive Nephropathy    (estimated GFR 23cc/min MDRD)  - Non Nephrotic proteinuria estimated at 600mg/day  - Hypokalemia  - Dilated CMP w/ EF 15% et? (apparently Pte. Unaware of this)  - Hypoalbuminemia  - Hx. Tobacco use      Plan:    - BP control imperative  - Avoid ARBs (allergic to ACEI also)  - Judicious diuresis  - Replace K+  - Check Renal artery doppler  - Cardiology w/u in progress  - Rec. Referral to SS regarding access to medical  care and follow up with specialists upon discharge.    Above discussed with Pte.

## 2019-11-27 NOTE — NURSING
Report given to .  Pt arrived on floor from Ultrasound and transporter took pt and all personal belongings to ICU.

## 2019-11-27 NOTE — PLAN OF CARE
11/27/19 1207   Discharge Assessment   Assessment Type Discharge Planning Assessment   Confirmed/corrected address and phone number on facesheet? Yes   Assessment information obtained from? Patient   Prior to hospitilization cognitive status: Alert/Oriented   Prior to hospitalization functional status: Independent   Current cognitive status: Alert/Oriented   Current Functional Status: Independent   Facility Arrived From: home   Lives With significant other   Able to Return to Prior Arrangements yes   Is patient able to care for self after discharge? Yes   Who are your caregiver(s) and their phone number(s)? yonas [phone]   Patient's perception of discharge disposition home or selfcare   Readmission Within the Last 30 Days no previous admission in last 30 days   Patient currently being followed by outpatient case management? No   Patient currently receives any other outside agency services? No   Equipment Currently Used at Home none   Do you have any problems affording any of your prescribed medications? No   Is the patient taking medications as prescribed? yes   Does the patient have transportation home? Yes   Transportation Anticipated car, drives self;family or friend will provide   Dialysis Name and Scheduled days N/A   Does the patient receive services at the Coumadin Clinic? No   Discharge Plan A Home with family   DME Needed Upon Discharge  none   Patient/Family in Agreement with Plan yes   Does the patient have transportation to healthcare appointments? Yes         [facility] [place] - [address]  [address] [place] [address]  Phone: [phone] Fax: [phone]

## 2019-11-28 PROBLEM — I77.810 AORTIC ROOT DILATATION: Status: ACTIVE | Noted: 2019-11-28

## 2019-11-28 PROBLEM — I50.41 ACUTE COMBINED SYSTOLIC AND DIASTOLIC HEART FAILURE: Status: ACTIVE | Noted: 2019-11-26

## 2019-11-28 PROBLEM — J81.0 ACUTE PULMONARY EDEMA: Status: ACTIVE | Noted: 2019-11-26

## 2019-11-28 LAB
ANION GAP SERPL CALC-SCNC: 11 MMOL/L (ref 8–16)
BUN SERPL-MCNC: 39 MG/DL (ref 6–20)
CALCIUM SERPL-MCNC: 9.4 MG/DL (ref 8.7–10.5)
CHLORIDE SERPL-SCNC: 102 MMOL/L (ref 95–110)
CO2 SERPL-SCNC: 26 MMOL/L (ref 23–29)
CREAT SERPL-MCNC: 3.8 MG/DL (ref 0.5–1.4)
EST. GFR  (AFRICAN AMERICAN): 21 ML/MIN/1.73 M^2
EST. GFR  (NON AFRICAN AMERICAN): 18 ML/MIN/1.73 M^2
GLUCOSE SERPL-MCNC: 100 MG/DL (ref 70–110)
POTASSIUM SERPL-SCNC: 3.4 MMOL/L (ref 3.5–5.1)
SODIUM SERPL-SCNC: 139 MMOL/L (ref 136–145)

## 2019-11-28 PROCEDURE — 99233 SBSQ HOSP IP/OBS HIGH 50: CPT | Mod: ,,, | Performed by: INTERNAL MEDICINE

## 2019-11-28 PROCEDURE — 63600175 PHARM REV CODE 636 W HCPCS: Performed by: HOSPITALIST

## 2019-11-28 PROCEDURE — 21400001 HC TELEMETRY ROOM

## 2019-11-28 PROCEDURE — 94761 N-INVAS EAR/PLS OXIMETRY MLT: CPT

## 2019-11-28 PROCEDURE — 25000003 PHARM REV CODE 250: Performed by: INTERNAL MEDICINE

## 2019-11-28 PROCEDURE — 25000003 PHARM REV CODE 250: Performed by: HOSPITALIST

## 2019-11-28 PROCEDURE — 99233 PR SUBSEQUENT HOSPITAL CARE,LEVL III: ICD-10-PCS | Mod: ,,, | Performed by: INTERNAL MEDICINE

## 2019-11-28 PROCEDURE — 36415 COLL VENOUS BLD VENIPUNCTURE: CPT

## 2019-11-28 PROCEDURE — 80048 BASIC METABOLIC PNL TOTAL CA: CPT

## 2019-11-28 PROCEDURE — 63600175 PHARM REV CODE 636 W HCPCS: Performed by: INTERNAL MEDICINE

## 2019-11-28 RX ORDER — MINOXIDIL 2.5 MG/1
2.5 TABLET ORAL 2 TIMES DAILY
Status: DISCONTINUED | OUTPATIENT
Start: 2019-11-28 | End: 2019-11-29

## 2019-11-28 RX ORDER — CARVEDILOL 6.25 MG/1
12.5 TABLET ORAL 2 TIMES DAILY
Status: DISCONTINUED | OUTPATIENT
Start: 2019-11-28 | End: 2019-11-30 | Stop reason: HOSPADM

## 2019-11-28 RX ORDER — FUROSEMIDE 40 MG/1
40 TABLET ORAL 2 TIMES DAILY
Status: DISCONTINUED | OUTPATIENT
Start: 2019-11-28 | End: 2019-11-30 | Stop reason: HOSPADM

## 2019-11-28 RX ORDER — HYDRALAZINE HYDROCHLORIDE 25 MG/1
75 TABLET, FILM COATED ORAL ONCE
Status: COMPLETED | OUTPATIENT
Start: 2019-11-28 | End: 2019-11-28

## 2019-11-28 RX ORDER — HYDRALAZINE HYDROCHLORIDE 25 MG/1
150 TABLET, FILM COATED ORAL 2 TIMES DAILY
Status: DISCONTINUED | OUTPATIENT
Start: 2019-11-28 | End: 2019-11-29

## 2019-11-28 RX ORDER — CARVEDILOL 6.25 MG/1
25 TABLET ORAL 2 TIMES DAILY
Status: DISCONTINUED | OUTPATIENT
Start: 2019-11-28 | End: 2019-11-28

## 2019-11-28 RX ORDER — CARVEDILOL 6.25 MG/1
12.5 TABLET ORAL 2 TIMES DAILY
Status: DISCONTINUED | OUTPATIENT
Start: 2019-11-28 | End: 2019-11-28

## 2019-11-28 RX ORDER — CARVEDILOL 6.25 MG/1
6.25 TABLET ORAL 2 TIMES DAILY
Status: DISCONTINUED | OUTPATIENT
Start: 2019-11-28 | End: 2019-11-28

## 2019-11-28 RX ORDER — IPRATROPIUM BROMIDE AND ALBUTEROL SULFATE 2.5; .5 MG/3ML; MG/3ML
3 SOLUTION RESPIRATORY (INHALATION) EVERY 4 HOURS PRN
Status: DISCONTINUED | OUTPATIENT
Start: 2019-11-28 | End: 2019-11-30 | Stop reason: HOSPADM

## 2019-11-28 RX ADMIN — FUROSEMIDE 40 MG: 40 TABLET ORAL at 05:11

## 2019-11-28 RX ADMIN — ISOSORBIDE MONONITRATE 30 MG: 30 TABLET, EXTENDED RELEASE ORAL at 08:11

## 2019-11-28 RX ADMIN — HYDRALAZINE HYDROCHLORIDE 150 MG: 25 TABLET, FILM COATED ORAL at 08:11

## 2019-11-28 RX ADMIN — HYDRALAZINE HYDROCHLORIDE 75 MG: 25 TABLET, FILM COATED ORAL at 07:11

## 2019-11-28 RX ADMIN — CARVEDILOL 12.5 MG: 6.25 TABLET, FILM COATED ORAL at 08:11

## 2019-11-28 RX ADMIN — CARVEDILOL 6.25 MG: 6.25 TABLET, FILM COATED ORAL at 08:11

## 2019-11-28 RX ADMIN — MINOXIDIL 2.5 MG: 2.5 TABLET ORAL at 08:11

## 2019-11-28 RX ADMIN — FUROSEMIDE 40 MG: 40 TABLET ORAL at 08:11

## 2019-11-28 RX ADMIN — HEPARIN SODIUM 5000 UNITS: 5000 INJECTION, SOLUTION INTRAVENOUS; SUBCUTANEOUS at 08:11

## 2019-11-28 RX ADMIN — HYDRALAZINE HYDROCHLORIDE 75 MG: 25 TABLET, FILM COATED ORAL at 06:11

## 2019-11-28 RX ADMIN — ATORVASTATIN CALCIUM 10 MG: 10 TABLET, FILM COATED ORAL at 08:11

## 2019-11-28 RX ADMIN — MINOXIDIL 2.5 MG: 2.5 TABLET ORAL at 12:11

## 2019-11-28 NOTE — PROGRESS NOTES
"                        Renal Progress Note    Date of Admission:  11/26/2019 12:47 PM    Length of Stay: 2  Days    Subjective: "I am getting anxious about being in the Hospital"    Objective:    Current Facility-Administered Medications   Medication    acetaminophen tablet 650 mg    atorvastatin tablet 10 mg    carvedilol tablet 6.25 mg    cloNIDine tablet 0.2 mg    furosemide tablet 40 mg    heparin (porcine) injection 5,000 Units    hydrALAZINE tablet 150 mg    influenza (QUADRIVALENT PF) vaccine 0.5 mL    isosorbide mononitrate 24 hr tablet 30 mg    ondansetron injection 8 mg    pneumoc 13-anaid conj-dip cr(PF) (PREVNAR 13 (PF)) 0.5 mL    polyethylene glycol packet 17 g    sodium chloride 0.9% flush 10 mL       Vitals:    11/28/19 0516 11/28/19 0746 11/28/19 0915 11/28/19 1106   BP: (!) 159/121 (!) 161/126 (!) 140/100 (!) 149/108   BP Location: Left arm Left arm Left arm Left arm   Patient Position: Lying Lying Sitting Sitting   Pulse: 88 87  90   Resp: 19 18 18   Temp: 98.3 °F (36.8 °C) 97.4 °F (36.3 °C)  98.3 °F (36.8 °C)   TempSrc: Oral Oral  Oral   SpO2: 98% 98%  97%   Weight:       Height:           I/O last 3 completed shifts:  In: 1048.4 [P.O.:655; I.V.:393.4]  Out: 5350 [Urine:5350]  No intake/output data recorded.      Physical Exam:    Looks restless  Neck: supple  Heart: RRR   Lungs: Unlabored breathing  Abdomen: n/a  : n/a  Extremities:  n/a  Neurologic: Awake, alert, oriented x 3      Laboratories:    No results for input(s): WBC, RBC, HGB, HCT, PLT, MCV, MCH, MCHC in the last 24 hours.    Recent Labs   Lab 11/28/19  0659   CALCIUM 9.4      K 3.4*   CO2 26      BUN 39*   CREATININE 3.8*       No results for input(s): COLORU, CLARITYU, SPECGRAV, PHUR, PROTEINUA, GLUCOSEU, BLOODU, WBCU, RBCU, BACTERIA, MUCUS in the last 24 hours.    Invalid input(s):  BILIRUBINCON    Microbiology Results (last 7 days)     Procedure Component Value Units Date/Time    Blood Culture #1 **CANNOT " BE ORDERED STAT** [[id number]] Collected:  11/26/19 1300    Order Status:  Completed Specimen:  Blood from Peripheral, Antecubital, Right Updated:  11/27/19 1503     Blood Culture, Routine No Growth to date      No Growth to date    Blood Culture #2 **CANNOT BE ORDERED STAT** [[id number]] Collected:  11/26/19 1307    Order Status:  Completed Specimen:  Blood from Peripheral, Hand, Right Updated:  11/27/19 1503     Blood Culture, Routine No Growth to date      No Growth to date    Respiratory Viral Panel by PCR [facility]; Nasal Swab [[id number]] Collected:  11/26/19 1450    Order Status:  Sent Specimen:  Respiratory Updated:  11/26/19 1459            Diagnostic Tests:     EXAMINATION:  US DOPPLER ABDOMEN/RETROPERITONEAL LIMITED  None    FINDINGS:  Kidneys are normal in size.  The right kidney measures 10.0 cm, left kidney measures 10.0 cm.  The arterial resistive indices are within normal limits.  Bilateral increased renal cortical echogenicity.  Right renal artery peak systolic velocity 25 centimeter/second, right renal artery to aortic systolic ratio 0.4.  Left renal artery peak systolic velocity 58 centimeter/second, left renal artery to aortic systolic ratio 0.9.  No tardus parvus waveforms.  Main renal veins are patent.  No hydronephrosis or renal masses.   Impression:       Sampled renal artery velocities within normal limits.         Assessment:     45 y/o male admitted with c.o. Dyspnea:     - Abnormal CXR due to Fluid overload  - Uncontrolled HTN (no evidence of VIRY)  - Likely Progression of CKD-3 to CKD-4  based on Renal US report, likely hypertensive Nephropathy and or Cardiogenic Nephropathy    (estimated GFR 23cc/min MDRD)  - Non Nephrotic proteinuria estimated at 600mg/day  - Hypokalemia  - Dilated CMP w/ EF 15% et? (apparently Pte. Unaware of this)  - Hypoalbuminemia  - Hx. Tobacco use      Plan:    - BP control: add Minoxidil bid  - Avoid ARBs (allergic to ACEI also)  - Start Minoxidil 2.5mg BID  - Titrate  Coreg dose up as tolerated  - Judicious diuresis  - Replace K+  - Cardiology w/u in progress, Pte. Needs Lifevest  - Rec. Referral to  regarding access to medical care, Medicaid enrollment so pte. can follow up with specialists in Clinic upon discharge.      Pte. Considering going home AMA, had a long discussion regarding his serious medical problems and likelihood that he will either have a major complication or even die since due to lack of insurance (awaiting for Medicaid) he will not be able to obtain adequate follow up if he leaves the Hospital.  Dr. Mccann was also present during our conversation.

## 2019-11-28 NOTE — SUBJECTIVE & OBJECTIVE
Interval History: patient states feeling anxious about being in the hospital.     Review of Systems   Respiratory: Negative.    Cardiovascular: Negative.    Gastrointestinal: Negative.      Objective:     Vital Signs (Most Recent):  Temp: 98.3 °F (36.8 °C) (11/28/19 1106)  Pulse: 90 (11/28/19 1106)  Resp: 18 (11/28/19 1106)  BP: (!) 144/116 (11/28/19 1124)  SpO2: 97 % (11/28/19 1106) Vital Signs (24h Range):  Temp:  [97.3 °F (36.3 °C)-98.4 °F (36.9 °C)] 98.3 °F (36.8 °C)  Pulse:  [] 90  Resp:  [18-20] 18  SpO2:  [95 %-99 %] 97 %  BP: (140-171)/(100-126) 144/116     Weight: 88.9 kg (195 lb 15.8 oz)  Body mass index is 25.16 kg/m².    Intake/Output Summary (Last 24 hours) at 11/28/2019 1508  Last data filed at 11/27/2019 1700  Gross per 24 hour   Intake 100 ml   Output 150 ml   Net -50 ml      Physical Exam   Constitutional: He is oriented to person, place, and time. He appears well-developed. No distress.   Non toxic appearing   Cardiovascular: Normal rate and regular rhythm.   Pulmonary/Chest: Effort normal and breath sounds normal.   Neurological: He is alert and oriented to person, place, and time.   Skin: He is not diaphoretic.   Psychiatric: His speech is normal and behavior is normal.   Not angry nor aggressive but visibly upset   Nursing note and vitals reviewed.      Significant Labs: All pertinent labs within the past 24 hours have been reviewed.    Significant Imaging: I have reviewed all pertinent imaging results/findings within the past 24 hours.  I have reviewed and interpreted all pertinent imaging results/findings within the past 24 hours.

## 2019-11-28 NOTE — PROGRESS NOTES
[facility]  Cardiology  Progress Note    Patient Name: Timo Youssef  MRN: [id number]  Admission Date: 11/26/2019  Hospital Length of Stay: 2 days  Code Status: Full Code   Attending Physician: Margie Soto MD   Primary Care Physician: Primary Doctor No  Expected Discharge Date:   Principal Problem:Dilated cardiomyopathy    Subjective:     Hospital Course:   11/26/19: adm with ?htn urg and ?cardiorenal synd    Interval Hx: feeling better, ambulating in room without difficulty.  No further SOB.  No cp/palps/LH.  LE edema resolved.  Importance of med rx compliance and diet modification stressed to pt.    Tele: SR 90s (personally reviewed and interpreted)      Past Medical History:   Diagnosis Date    Cigarette smoker     Hypertension     Pneumonia 11/26/2019       Past Surgical History:   Procedure Laterality Date    skull fracture repair      in childhood       Review of patient's allergies indicates:   Allergen Reactions    Lisinopril (bulk) Other (See Comments)     Angioedema         No current facility-administered medications on file prior to encounter.      Current Outpatient Medications on File Prior to Encounter   Medication Sig    losartan (COZAAR) 100 MG tablet Take 100 mg by mouth once daily.    verapamil (CALAN-SR) 120 MG CR tablet Take 1 tablet (120 mg total) by mouth once daily.    cetirizine (ZYRTEC) 10 MG tablet Take 1 tablet (10 mg total) by mouth once daily. for 6 days    hydrALAZINE (APRESOLINE) 50 MG tablet Take 1 tablet (50 mg total) by mouth 3 (three) times daily.     Family History     None        Tobacco Use    Smoking status: Current Every Day Smoker     Packs/day: 0.25     Types: Cigars    Smokeless tobacco: Never Used   Substance and Sexual Activity    Alcohol use: Yes    Drug use: No    Sexual activity: Not on file     Review of Systems   Gastrointestinal: Negative for melena.   Genitourinary: Negative for hematuria.     Objective:     Vital Signs  (Most Recent):  Temp: 98.3 °F (36.8 °C) (11/28/19 0516)  Pulse: 88 (11/28/19 0516)  Resp: 19 (11/28/19 0516)  BP: (!) 159/121 (11/28/19 0516)  SpO2: 98 % (11/28/19 0516) Vital Signs (24h Range):  Temp:  [97.3 °F (36.3 °C)-98.7 °F (37.1 °C)] 98.3 °F (36.8 °C)  Pulse:  [] 88  Resp:  [15-20] 19  SpO2:  [94 %-99 %] 98 %  BP: (130-171)/() 159/121     Weight: 88.9 kg (195 lb 15.8 oz)  Body mass index is 25.16 kg/m².    SpO2: 98 %  O2 Device (Oxygen Therapy): room air      Intake/Output Summary (Last 24 hours) at 11/28/2019 0638  Last data filed at 11/27/2019 1700  Gross per 24 hour   Intake 212.29 ml   Output 1700 ml   Net -1487.71 ml       Lines/Drains/Airways     Peripheral Intravenous Line                 Peripheral IV - Single Lumen 11/26/19 1318 18 G Right Antecubital 1 day         Peripheral IV - Single Lumen 11/26/19 1925 Anterior;Left Antecubital 1 day              Exam unchanged vs 11/27/19  Physical Exam   Constitutional: He is oriented to person, place, and time. He appears well-developed and well-nourished.   HENT:   Head: Normocephalic and atraumatic.   Eyes: Pupils are equal, round, and reactive to light. Conjunctivae and EOM are normal. No scleral icterus.   Neck: Normal range of motion. Neck supple. No JVD present. No tracheal deviation present. No thyromegaly present.   Cardiovascular: Normal rate, regular rhythm, S1 normal and S2 normal. Exam reveals no gallop and no friction rub.   No murmur heard.  Pulmonary/Chest: Effort normal. No respiratory distress. He has no wheezes. He has no rales. He exhibits no tenderness.   Abdominal: Soft. He exhibits no distension.   Musculoskeletal: Normal range of motion. He exhibits no edema.   Neurological: He is alert and oriented to person, place, and time. He has normal strength. No cranial nerve deficit.   Skin: Skin is warm and dry. No rash noted.   Psychiatric: He has a normal mood and affect. His behavior is normal.       Current Medications:    atorvastatin  10 mg Oral QHS    carvedilol  3.125 mg Oral BID    furosemide  40 mg Intravenous BID    heparin (porcine)  5,000 Units Subcutaneous Q12H    hydrALAZINE  75 mg Oral Q8H    isosorbide mononitrate  30 mg Oral Daily       acetaminophen, cloNIDine, influenza, ondansetron, pneumoc 13-anaid conj-dip cr(PF), polyethylene glycol, sodium chloride 0.9%    Laboratory (all labs reviewed):  CBC:  Recent Labs   Lab 03/05/19 1940 11/26/19  1300 11/27/19 0223   WBC 7.33 8.02 7.56   Hemoglobin 14.8 13.9 L 13.9 L   Hematocrit 45.5 41.7 40.9   Platelets 252 233 233       CHEMISTRIES:  Recent Labs   Lab 03/05/19 1940 11/26/19  1300 11/27/19 0223   Glucose 76 116 H 107   Sodium 140 141 142   Potassium 3.9 3.3 L 3.2 L   BUN, Bld 23 H 39 H 38 H   Creatinine 2.3 H 3.7 H 3.6 H   eGFR if  38 A 21 A 22 A   eGFR if non  33 A 18 A 19 A   Calcium 9.9 9.1 9.0   Magnesium  --   --  1.6       CARDIAC BIOMARKERS:  Recent Labs   Lab 11/26/19  1300 11/26/19 1946 11/27/19 0223   Troponin I 0.456 H 0.475 H 0.463 H       COAGS:  Recent Labs   Lab 03/05/19 1940   INR 1.0       LIPIDS/LFTS:  Recent Labs   Lab 03/05/19 1940 11/26/19  1300 11/27/19 0223   Cholesterol  --   --  167   Triglycerides  --   --  109   HDL  --   --  62   LDL Cholesterol  --   --  83.2   Non-HDL Cholesterol  --   --  105   AST 22 31 32   ALT 17 33 37       BNP:  Recent Labs   Lab 11/26/19  1300   BNP 2,792 H       TSH:  Recent Labs   Lab 11/27/19 0223   TSH 0.527       Free T4:        Diagnostic Results:  ECG (personally reviewed and interpreted tracing(s)):  11/26/19 1255 , LAD, NSSTTW changes    Chest X-Ray (personally reviewed and interpreted image(s)): 11/26/19 ?bilat PNA vs batwing pulm edema    Renal art US 11/27/19  Sampled renal artery velocities within normal limits.    LE venous US 11/26/19  No evidence of deep venous thrombosis in either lower extremity.    Echo: 11/26/19 (images personally reviewed and  interpreted)  · Severely decreased left ventricular systolic function. The estimated ejection fraction is 15%  · Severe global hypokinetic wall motion.  · Moderate concentric left ventricular hypertrophy.  · Mild left ventricular enlargement.  · Grade III (severe) left ventricular diastolic dysfunction consistent with restrictive physiology.  · The sinuses of Valsalva is moderately dilated. 4.5cm.  · Mildly reduced right ventricular systolic function.  · Mild mitral regurgitation.  · The estimated PA systolic pressure is 34 mm Hg          Assessment and Plan:     * Dilated cardiomyopathy  EF 15%, global HK on echo  ?etiology, ddx wide at this point  Current sxs of cough/SOB/edema likely d/t vol overload, much improved with diuresis/med rx  Stop cardene infusion  Titrate hydrala/Imdur as BP will allow (inc hydrala to 150mg bid today)  Inc coreg 6.25mg bid and titrate as HR/BP will allow  Change lasix to 40mg PO bid  Goal net neg 1-1.5L daily  Eventual isch eval (MPI vs cath) pending clinical course (will need significant improvement in renal fxn to consider cath), likely as outpatient.  Lifevest ordered 11/27/19.    Acute renal failure  Mgmt per renal  Likely cardiorenal synd  Avoid nephrotoxins, no plan for ARB (and ACE allergy noted)  Appreciate renal recs  VIRY US neg  Check BMP today  Change lasix to 40mg PO bid (from IV)    Shortness of breath  As per DCM section  resolved    Chronic kidney disease, stage 3  Creat 2.3 in 3/3019, now 3.7->3.6-> pending today.  Neph eval appreciated  Cont diuresis, change lasix to PO  Cont atorva 10mg qd  Check lipids/LFT 3 months (late Feb 2020)    Elevated troponin I level  Likely demand/CHF/CKD  No anginal sxs, doubt ACS    Essential hypertension  Med rx as above    Aortic root dilatation  4.5 cm on echo 11/26/19  Repeat echo in 6 months (May 2020)        VTE Risk Mitigation (From admission, onward)         Ordered     heparin (porcine) injection 5,000 Units  Every 12 hours       11/26/19 1531     Place HARSHIL hose  Until discontinued      11/26/19 1530     Place sequential compression device  Until discontinued      11/26/19 1530              Dispo planning appropriate  Pt to follow up with me in the office after discharge for testing.  Cardiology will sign off, pls call with questions.    Alber Mora MD  Cardiology  [facility]

## 2019-11-28 NOTE — ASSESSMENT & PLAN NOTE
Creat 2.3 in 3/3019, now 3.7->3.6-> pending today.  Neph eval appreciated  Cont diuresis, change lasix to PO  Cont atorva 10mg qd  Check lipids/LFT 3 months (late Feb 2020)

## 2019-11-28 NOTE — SUBJECTIVE & OBJECTIVE
Past Medical History:   Diagnosis Date    Cigarette smoker     Hypertension     Pneumonia 11/26/2019       Past Surgical History:   Procedure Laterality Date    skull fracture repair      in childhood       Review of patient's allergies indicates:   Allergen Reactions    Lisinopril (bulk) Other (See Comments)     Angioedema         No current facility-administered medications on file prior to encounter.      Current Outpatient Medications on File Prior to Encounter   Medication Sig    losartan (COZAAR) 100 MG tablet Take 100 mg by mouth once daily.    verapamil (CALAN-SR) 120 MG CR tablet Take 1 tablet (120 mg total) by mouth once daily.    cetirizine (ZYRTEC) 10 MG tablet Take 1 tablet (10 mg total) by mouth once daily. for 6 days    hydrALAZINE (APRESOLINE) 50 MG tablet Take 1 tablet (50 mg total) by mouth 3 (three) times daily.     Family History     None        Tobacco Use    Smoking status: Current Every Day Smoker     Packs/day: 0.25     Types: Cigars    Smokeless tobacco: Never Used   Substance and Sexual Activity    Alcohol use: Yes    Drug use: No    Sexual activity: Not on file     Review of Systems   Gastrointestinal: Negative for melena.   Genitourinary: Negative for hematuria.     Objective:     Vital Signs (Most Recent):  Temp: 98.3 °F (36.8 °C) (11/28/19 0516)  Pulse: 88 (11/28/19 0516)  Resp: 19 (11/28/19 0516)  BP: (!) 159/121 (11/28/19 0516)  SpO2: 98 % (11/28/19 0516) Vital Signs (24h Range):  Temp:  [97.3 °F (36.3 °C)-98.7 °F (37.1 °C)] 98.3 °F (36.8 °C)  Pulse:  [] 88  Resp:  [15-20] 19  SpO2:  [94 %-99 %] 98 %  BP: (130-171)/() 159/121     Weight: 88.9 kg (195 lb 15.8 oz)  Body mass index is 25.16 kg/m².    SpO2: 98 %  O2 Device (Oxygen Therapy): room air      Intake/Output Summary (Last 24 hours) at 11/28/2019 0638  Last data filed at 11/27/2019 1700  Gross per 24 hour   Intake 212.29 ml   Output 1700 ml   Net -1487.71 ml       Lines/Drains/Airways     Peripheral  Intravenous Line                 Peripheral IV - Single Lumen 11/26/19 1318 18 G Right Antecubital 1 day         Peripheral IV - Single Lumen 11/26/19 1925 Anterior;Left Antecubital 1 day              Exam unchanged vs 11/27/19  Physical Exam   Constitutional: He is oriented to person, place, and time. He appears well-developed and well-nourished.   HENT:   Head: Normocephalic and atraumatic.   Eyes: Pupils are equal, round, and reactive to light. Conjunctivae and EOM are normal. No scleral icterus.   Neck: Normal range of motion. Neck supple. No JVD present. No tracheal deviation present. No thyromegaly present.   Cardiovascular: Normal rate, regular rhythm, S1 normal and S2 normal. Exam reveals no gallop and no friction rub.   No murmur heard.  Pulmonary/Chest: Effort normal. No respiratory distress. He has no wheezes. He has no rales. He exhibits no tenderness.   Abdominal: Soft. He exhibits no distension.   Musculoskeletal: Normal range of motion. He exhibits no edema.   Neurological: He is alert and oriented to person, place, and time. He has normal strength. No cranial nerve deficit.   Skin: Skin is warm and dry. No rash noted.   Psychiatric: He has a normal mood and affect. His behavior is normal.       Current Medications:   atorvastatin  10 mg Oral QHS    carvedilol  3.125 mg Oral BID    furosemide  40 mg Intravenous BID    heparin (porcine)  5,000 Units Subcutaneous Q12H    hydrALAZINE  75 mg Oral Q8H    isosorbide mononitrate  30 mg Oral Daily       acetaminophen, cloNIDine, influenza, ondansetron, pneumoc 13-anaid conj-dip cr(PF), polyethylene glycol, sodium chloride 0.9%    Laboratory (all labs reviewed):  CBC:  Recent Labs   Lab 03/05/19 1940 11/26/19  1300 11/27/19  0223   WBC 7.33 8.02 7.56   Hemoglobin 14.8 13.9 L 13.9 L   Hematocrit 45.5 41.7 40.9   Platelets 252 233 233       CHEMISTRIES:  Recent Labs   Lab 03/05/19 1940 11/26/19  1300 11/27/19  0223   Glucose 76 116 H 107   Sodium 140 141  142   Potassium 3.9 3.3 L 3.2 L   BUN, Bld 23 H 39 H 38 H   Creatinine 2.3 H 3.7 H 3.6 H   eGFR if  38 A 21 A 22 A   eGFR if non  33 A 18 A 19 A   Calcium 9.9 9.1 9.0   Magnesium  --   --  1.6       CARDIAC BIOMARKERS:  Recent Labs   Lab 11/26/19  1300 11/26/19  1946 11/27/19  0223   Troponin I 0.456 H 0.475 H 0.463 H       COAGS:  Recent Labs   Lab 03/05/19 1940   INR 1.0       LIPIDS/LFTS:  Recent Labs   Lab 03/05/19 1940 11/26/19  1300 11/27/19  0223   Cholesterol  --   --  167   Triglycerides  --   --  109   HDL  --   --  62   LDL Cholesterol  --   --  83.2   Non-HDL Cholesterol  --   --  105   AST 22 31 32   ALT 17 33 37       BNP:  Recent Labs   Lab 11/26/19  1300   BNP 2,792 H       TSH:  Recent Labs   Lab 11/27/19  0223   TSH 0.527       Free T4:        Diagnostic Results:  ECG (personally reviewed and interpreted tracing(s)):  11/26/19 1255 , LAD, NSSTTW changes    Chest X-Ray (personally reviewed and interpreted image(s)): 11/26/19 ?bilat PNA vs batwing pulm edema    Renal art US 11/27/19  Sampled renal artery velocities within normal limits.    LE venous US 11/26/19  No evidence of deep venous thrombosis in either lower extremity.    Echo: 11/26/19 (images personally reviewed and interpreted)  · Severely decreased left ventricular systolic function. The estimated ejection fraction is 15%  · Severe global hypokinetic wall motion.  · Moderate concentric left ventricular hypertrophy.  · Mild left ventricular enlargement.  · Grade III (severe) left ventricular diastolic dysfunction consistent with restrictive physiology.  · The sinuses of Valsalva is moderately dilated. 4.5cm.  · Mildly reduced right ventricular systolic function.  · Mild mitral regurgitation.  · The estimated PA systolic pressure is 34 mm Hg

## 2019-11-28 NOTE — ASSESSMENT & PLAN NOTE
Acute combined systolic and diastolic heart failure.  Symptoms improved with IV diuresis and better control of his malignant hypertension  Blood pressure still very high, especially diastolic.   Agree with cardiology and nephrology's new antihypertensive regimen   Diuretics changed to oral. Renal function is poor therefore monitoring closely  Ischemic workup per Cardiology  Patient is uninsured but seems like is Medicaid pending  Life vest ordered pending insurance

## 2019-11-28 NOTE — PROGRESS NOTES
"[facility]  Progress Note    Patient Name: Timo Youssef  MRN: [id number]  Patient Class: IP- Inpatient   Admission Date: 11/26/2019  Length of Stay: 2 days  Attending Physician: Margie Soto MD  Primary Care Provider: Primary Doctor No        Subjective:     Principal Problem:Acute combined systolic and diastolic heart failure        HPI:  44 year old male with hypertension, chronic kidney disease and who is a cigar smoker who presented with progressive shortness of breath of 1.5 weeks in evolution. Associated symptoms include cough, chills, lower extremity swelling and chest pain with coughing. Denied fever or sick contacts. 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 He was seen at urgent care today. Had a chest ray obtained and was told to come here for evaluation of "multifocal pneumonia". Only medication he takes at home is verapamil. Used to be on losartan but taken off due to recall. Is allergic to lisinopril.    In the ED, patient was hypertensive. Afebrile and speaking mid sentences but with adequate sats at room air and in no distress. Labwork significant for D-Dimer 0.76, BNP 2792, trop 0.456 and EKG with prolonged QTc, T wave inversion V2/V6. Patient admitted to hospital medicine for possible viral syndrome vs bacterial pneumonia, acute renal failure and Cardiology evaluation.     Overview/Hospital Course:  43 y/o male admitted with acute systolic and diastolic heart failure.  Echo with EF of 15%.  Started on IV Lasix.  Uncontrolled BP and sent to ICU on Cardene gtt.  Hydralazine dose increased and started on Coreg.  Able to wean off Cardene gtt.  Patient also with worsening renal failure.  Cardiology recommending life vest.    Interval History: patient states feeling anxious about being in the hospital.     Review of Systems   Respiratory: Negative.    Cardiovascular: Negative.    Gastrointestinal: " Negative.      Objective:     Vital Signs (Most Recent):  Temp: 98.3 °F (36.8 °C) (11/28/19 1106)  Pulse: 90 (11/28/19 1106)  Resp: 18 (11/28/19 1106)  BP: (!) 144/116 (11/28/19 1124)  SpO2: 97 % (11/28/19 1106) Vital Signs (24h Range):  Temp:  [97.3 °F (36.3 °C)-98.4 °F (36.9 °C)] 98.3 °F (36.8 °C)  Pulse:  [] 90  Resp:  [18-20] 18  SpO2:  [95 %-99 %] 97 %  BP: (140-171)/(100-126) 144/116     Weight: 88.9 kg (195 lb 15.8 oz)  Body mass index is 25.16 kg/m².    Intake/Output Summary (Last 24 hours) at 11/28/2019 1508  Last data filed at 11/27/2019 1700  Gross per 24 hour   Intake 100 ml   Output 150 ml   Net -50 ml      Physical Exam   Constitutional: He is oriented to person, place, and time. He appears well-developed. No distress.   Non toxic appearing   Cardiovascular: Normal rate and regular rhythm.   Pulmonary/Chest: Effort normal and breath sounds normal.   Neurological: He is alert and oriented to person, place, and time.   Skin: He is not diaphoretic.   Psychiatric: His speech is normal and behavior is normal.   Not angry nor aggressive but visibly upset   Nursing note and vitals reviewed.      Significant Labs: All pertinent labs within the past 24 hours have been reviewed.    Significant Imaging: I have reviewed all pertinent imaging results/findings within the past 24 hours.  I have reviewed and interpreted all pertinent imaging results/findings within the past 24 hours.      Assessment/Plan:      * Acute combined systolic and diastolic heart failure  Acute combined systolic and diastolic heart failure.  Symptoms improved with IV diuresis and better control of his malignant hypertension  Blood pressure still very high, especially diastolic.   Agree with cardiology and nephrology's new antihypertensive regimen   Diuretics changed to oral. Renal function is poor therefore monitoring closely  Ischemic workup per Cardiology  Patient is uninsured but seems like is Medicaid pending  Life vest ordered  pending insurance    Elevated troponin I level  Elevated troponin 0.4 in setting of poor renal clearance  Chest pain free.      Acute renal failure  Patient's renal function is poor while in need of diuresis for severe heart failure  Thus far has diuresed well but unfortunately we are still trying to figure out a good diuretic regimen and establish a baseline in terms of renal function. Also, blood pressure very difficult to control.   Being uninsured makes follow up and prescription of medications very difficult  Patient might eventually needs hemodialysis  Patient has very poor insight of his current condition and is asking to leave AMA  Nephrologist and myself discussed with patient the severity of his condition and high risk for major complications or even death.           Shortness of breath  Secondary to CHF as above.      Acute pulmonary edema  As above      Essential hypertension  Malignant HTN as above.      Tobacco use disorder, severe, dependence  Spent > 5 minutes on smoking cessation education  Patient motivated to quit but not interested in nicotine patch at this time      Chronic kidney disease, stage 3  Creat of 2.3 around 9 months ago.      VTE Risk Mitigation (From admission, onward)         Ordered     heparin (porcine) injection 5,000 Units  Every 12 hours      11/26/19 1531     Place HARSHIL hose  Until discontinued      11/26/19 1530     Place sequential compression device  Until discontinued      11/26/19 1530                      Margie Mccann MD  Department of Hospital Medicine   [facility]

## 2019-11-28 NOTE — ASSESSMENT & PLAN NOTE
Patient's renal function is poor while in need of diuresis for severe heart failure  Thus far has diuresed well but unfortunately we are still trying to figure out a good diuretic regimen and establish a baseline in terms of renal function. Also, blood pressure very difficult to control.   Being uninsured makes follow up and prescription of medications very difficult  Patient might eventually needs hemodialysis  Patient has very poor insight of his current condition and is asking to leave AMA  Nephrologist and myself discussed with patient the severity of his condition and high risk for major complications or even death.

## 2019-11-28 NOTE — ASSESSMENT & PLAN NOTE
Mgmt per renal  Likely cardiorenal synd  Avoid nephrotoxins, no plan for ARB (and ACE allergy noted)  Appreciate renal recs  VIRY US neg  Check BMP today  Change lasix to 40mg PO bid (from IV)

## 2019-11-28 NOTE — ASSESSMENT & PLAN NOTE
EF 15%, global HK on echo  ?etiology, ddx wide at this point  Current sxs of cough/SOB/edema likely d/t vol overload, much improved with diuresis/med rx  Stop cardene infusion  Titrate hydrala/Imdur as BP will allow (inc hydrala to 150mg bid today)  Inc coreg 6.25mg bid and titrate as HR/BP will allow  Change lasix to 40mg PO bid  Goal net neg 1-1.5L daily  Eventual isch eval (MPI vs cath) pending clinical course (will need significant improvement in renal fxn to consider cath).  Lifevest ordered 11/27/19.

## 2019-11-28 NOTE — PROGRESS NOTES
Patient needs life vest--medicaid pending; sent off to [facility] for possible processing. Patient does not currently have insurance, so referral information provided for [facility] for cardiology follow-up and establish care

## 2019-11-28 NOTE — NURSING
0800 Patient awake, alert and oriented times 4. /126. Denies headache, no dizziness, no weakness. Am blood pressure meds given. Dr Soto notified.  0900 - /100 manually

## 2019-11-29 VITALS
HEART RATE: 92 BPM | WEIGHT: 188.25 LBS | OXYGEN SATURATION: 98 % | SYSTOLIC BLOOD PRESSURE: 156 MMHG | RESPIRATION RATE: 18 BRPM | TEMPERATURE: 98 F | HEIGHT: 74 IN | DIASTOLIC BLOOD PRESSURE: 98 MMHG | BODY MASS INDEX: 24.16 KG/M2

## 2019-11-29 LAB
ANION GAP SERPL CALC-SCNC: 11 MMOL/L (ref 8–16)
BUN SERPL-MCNC: 44 MG/DL (ref 6–20)
CALCIUM SERPL-MCNC: 8.8 MG/DL (ref 8.7–10.5)
CHLORIDE SERPL-SCNC: 104 MMOL/L (ref 95–110)
CO2 SERPL-SCNC: 24 MMOL/L (ref 23–29)
CREAT SERPL-MCNC: 3.8 MG/DL (ref 0.5–1.4)
EST. GFR  (AFRICAN AMERICAN): 21 ML/MIN/1.73 M^2
EST. GFR  (NON AFRICAN AMERICAN): 18 ML/MIN/1.73 M^2
GLUCOSE SERPL-MCNC: 101 MG/DL (ref 70–110)
POTASSIUM SERPL-SCNC: 3 MMOL/L (ref 3.5–5.1)
SODIUM SERPL-SCNC: 139 MMOL/L (ref 136–145)

## 2019-11-29 PROCEDURE — 25000003 PHARM REV CODE 250: Performed by: INTERNAL MEDICINE

## 2019-11-29 PROCEDURE — 80048 BASIC METABOLIC PNL TOTAL CA: CPT

## 2019-11-29 PROCEDURE — 36415 COLL VENOUS BLD VENIPUNCTURE: CPT

## 2019-11-29 PROCEDURE — 63600175 PHARM REV CODE 636 W HCPCS: Performed by: HOSPITALIST

## 2019-11-29 PROCEDURE — 25000003 PHARM REV CODE 250: Performed by: HOSPITALIST

## 2019-11-29 RX ORDER — NIFEDIPINE 30 MG/1
90 TABLET, EXTENDED RELEASE ORAL DAILY
Qty: 90 TABLET | Refills: 11 | Status: SHIPPED | OUTPATIENT
Start: 2019-11-30 | End: 2020-11-29

## 2019-11-29 RX ORDER — MINOXIDIL 2.5 MG/1
5 TABLET ORAL 2 TIMES DAILY
Status: DISCONTINUED | OUTPATIENT
Start: 2019-11-29 | End: 2019-11-30 | Stop reason: HOSPADM

## 2019-11-29 RX ORDER — NIFEDIPINE 30 MG/1
30 TABLET, EXTENDED RELEASE ORAL DAILY
Status: DISCONTINUED | OUTPATIENT
Start: 2019-11-29 | End: 2019-11-30 | Stop reason: HOSPADM

## 2019-11-29 RX ORDER — ATORVASTATIN CALCIUM 10 MG/1
10 TABLET, FILM COATED ORAL NIGHTLY
Qty: 90 TABLET | Refills: 3 | Status: SHIPPED | OUTPATIENT
Start: 2019-11-29 | End: 2020-11-28

## 2019-11-29 RX ORDER — FUROSEMIDE 40 MG/1
40 TABLET ORAL 2 TIMES DAILY
Qty: 60 TABLET | Refills: 11 | Status: SHIPPED | OUTPATIENT
Start: 2019-11-29 | End: 2020-11-28

## 2019-11-29 RX ORDER — CARVEDILOL 12.5 MG/1
12.5 TABLET ORAL 2 TIMES DAILY
Qty: 60 TABLET | Refills: 11 | Status: SHIPPED | OUTPATIENT
Start: 2019-11-29 | End: 2020-11-28

## 2019-11-29 RX ADMIN — CARVEDILOL 12.5 MG: 6.25 TABLET, FILM COATED ORAL at 08:11

## 2019-11-29 RX ADMIN — FUROSEMIDE 40 MG: 40 TABLET ORAL at 08:11

## 2019-11-29 RX ADMIN — ISOSORBIDE MONONITRATE 30 MG: 30 TABLET, EXTENDED RELEASE ORAL at 08:11

## 2019-11-29 RX ADMIN — NIFEDIPINE 30 MG: 30 TABLET, FILM COATED, EXTENDED RELEASE ORAL at 10:11

## 2019-11-29 RX ADMIN — FUROSEMIDE 40 MG: 40 TABLET ORAL at 05:11

## 2019-11-29 RX ADMIN — HYDRALAZINE HYDROCHLORIDE 150 MG: 25 TABLET, FILM COATED ORAL at 08:11

## 2019-11-29 RX ADMIN — POTASSIUM BICARBONATE 50 MEQ: 978 TABLET, EFFERVESCENT ORAL at 10:11

## 2019-11-29 RX ADMIN — MINOXIDIL 2.5 MG: 2.5 TABLET ORAL at 08:11

## 2019-11-29 RX ADMIN — HEPARIN SODIUM 5000 UNITS: 5000 INJECTION, SOLUTION INTRAVENOUS; SUBCUTANEOUS at 08:11

## 2019-11-29 NOTE — PROGRESS NOTES
Pt is aware that he is discharged but awaiting Life Vest to be delivered. Pt removed telemetry monitor, refusing to put back on at this time.

## 2019-11-29 NOTE — CONSULTS
Life Vest ordered; patient provided with [facility] address and contact information to set up with cardiology and primary care.

## 2019-11-29 NOTE — PROGRESS NOTES
Renal Progress Note    Date of Admission:  11/26/2019 12:47 PM    Length of Stay: 3  Days    Subjective: n/a    Objective:    Current Facility-Administered Medications   Medication    acetaminophen tablet 650 mg    albuterol-ipratropium 2.5 mg-0.5 mg/3 mL nebulizer solution 3 mL    atorvastatin tablet 10 mg    carvedilol tablet 12.5 mg    cloNIDine tablet 0.2 mg    furosemide tablet 40 mg    heparin (porcine) injection 5,000 Units    influenza (QUADRIVALENT PF) vaccine 0.5 mL    isosorbide mononitrate 24 hr tablet 30 mg    minoxidil tablet 5 mg    NIFEdipine 24 hr tablet 30 mg    ondansetron injection 8 mg    pneumoc 13-anaid conj-dip cr(PF) (PREVNAR 13 (PF)) 0.5 mL    polyethylene glycol packet 17 g    sodium chloride 0.9% flush 10 mL       Vitals:    11/28/19 2054 11/28/19 2328 11/29/19 0452 11/29/19 0815   BP: (!) 180/121 (!) 163/114 (!) 162/108 (!) 158/108   BP Location:  Left arm Left arm Left arm   Patient Position:  Lying Lying Lying   Pulse: 91 92 89 82   Resp:  18 18 18   Temp:  98.8 °F (37.1 °C) 98.2 °F (36.8 °C) 98.2 °F (36.8 °C)   TempSrc:  Oral Oral Oral   SpO2:  99% 97% 99%   Weight:   85.4 kg (188 lb 4.4 oz)    Height:           No intake/output data recorded.  No intake/output data recorded.      Laboratories:    No results for input(s): WBC, RBC, HGB, HCT, PLT, MCV, MCH, MCHC in the last 24 hours.    Recent Labs   Lab 11/29/19  0607   CALCIUM 8.8      K 3.0*   CO2 24      BUN 44*   CREATININE 3.8*       No results for input(s): COLORU, CLARITYU, SPECGRAV, PHUR, PROTEINUA, GLUCOSEU, BLOODU, WBCU, RBCU, BACTERIA, MUCUS in the last 24 hours.    Invalid input(s):  BILIRUBINCON    Microbiology Results (last 7 days)     Procedure Component Value Units Date/Time    Blood Culture #1 **CANNOT BE ORDERED STAT** [[id number]] Collected:  11/26/19 1300    Order Status:  Completed Specimen:  Blood from Peripheral, Antecubital, Right Updated:  11/28/19 1509     Blood  Culture, Routine No Growth to date      No Growth to date      No Growth to date    Blood Culture #2 **CANNOT BE ORDERED STAT** [[id number]] Collected:  11/26/19 1307    Order Status:  Completed Specimen:  Blood from Peripheral, Hand, Right Updated:  11/28/19 1503     Blood Culture, Routine No Growth to date      No Growth to date      No Growth to date    Respiratory Viral Panel by PCR [facility]; Nasal Swab [[id number]] Collected:  11/26/19 1450    Order Status:  Sent Specimen:  Respiratory Updated:  11/26/19 1453            Diagnostic Tests:     EXAMINATION:  US DOPPLER ABDOMEN/RETROPERITONEAL LIMITED  None    FINDINGS:  Kidneys are normal in size.  The right kidney measures 10.0 cm, left kidney measures 10.0 cm.  The arterial resistive indices are within normal limits.  Bilateral increased renal cortical echogenicity.  Right renal artery peak systolic velocity 25 centimeter/second, right renal artery to aortic systolic ratio 0.4.  Left renal artery peak systolic velocity 58 centimeter/second, left renal artery to aortic systolic ratio 0.9.  No tardus parvus waveforms.  Main renal veins are patent.  No hydronephrosis or renal masses.   Impression:       Sampled renal artery velocities within normal limits.         Assessment:     43 y/o male admitted with c.o. Dyspnea:     - Fluid overload due to HF-dilated CMP EF 15% (new Dx.)  - Uncontrolled HTN (no evidence of VIRY)  - Likely Progression of CKD-3 to CKD-4  based on Renal US report, likely hypertensive Nephropathy and or Cardiogenic Nephropathy    (estimated GFR 23cc/min MDRD)  - Non Nephrotic proteinuria estimated at 600mg/day  - Hypokalemia  - Hypoalbuminemia  - Hx. Tobacco use  - Very poor insight and understanding of his illness      Plan:    - BP control: Increase Minoxidil to 5mg bid and add Nifedipine ER  30mg day  - Avoid ARBs (allergic to ACEI also)  - Judicious diuresis w/ p.o. Lasix   - Replace K+  - Cardiology w/u in progress, Pte. Needs Lifevest  - SS  helping Pte. W/ Referral to [facility] for out-pte f/u when ready to go home (Pte. Uninsured)  - Other problems per  Dr. Mccann   - Home when BP controlled and cleared by Cardiology    Discussed with Dr. Mccann.

## 2019-11-30 NOTE — PROGRESS NOTES
Life vest still not delivered. Called [facility], was given phone number to  jelena [phone]. He told me that he received order for a new life vest but no / case management reached out to him so he called arabella cohn, a number he had on hand. He then told me because he is medicaid pending and medicaid office is closed today, medicaid could not be verified. The other options were to have delivery of the life vest on a hospital lease or patient to make a $500 down payment. He stated Arabella Cohn was to follow up with him but never heard back from her or anyone. Dr Mccann updated, state if patients leaves he would not be leaving AMA. Patient updated on miscommunication. Stated he still wants to leave without life vest. He is aware of potential consequences of not wearing vest such as lethal heart arrthymias. He asked if he could have case management's phone number in case he have questions about the life vest process.    IV removed, tip intact. AVS given and discussed with patient, including new prescriptions, home medication, follow up appointments to make. Pt verbalized understanding. Escorted to front lobby.

## 2019-12-01 LAB
BACTERIA BLD CULT: NORMAL
BACTERIA BLD CULT: NORMAL

## 2019-12-01 NOTE — PROGRESS NOTES
Dr. Harris notified pt left without life vest and will notified Dr. Mora. Dr. Harris understand CM will follow-up with pt on Monday and has been informed pt is pending medicaid and may not be able to afford life vest. Pt appointment scheduled with [facility], and CM will follow-up with pt on Monday.

## 2019-12-02 ENCOUNTER — PATIENT OUTREACH (OUTPATIENT)
Dept: ADMINISTRATIVE | Facility: CLINIC | Age: 44
End: 2019-12-02

## 2019-12-02 NOTE — PROGRESS NOTES
12/2/19 900:  TN spoke with CATRACHO Hughes LPN II; SHANELL mgr or stated that per nursing patient left this weekend and did not receive his lifevest that was ordered.  TN explained that the only dealing this TN had with this patient that a call was received for Rhea SALGADO on Saturday stating that he needed to speak with the  assigned to the case.  TN provided name and number of CM.  This am TN was informed that the patient left over the weekend without the vest.  TN received a call from Lucero at [facility] who stated that she has not been able to verify patient's benefits.  She expects this to occur by 1pm today.  TN will call patient when benefits verified.    1130:  TN was notified by Dr Mccann that she just saw the patient in the hospital cafe.  TN went to speak with patient who stated that Dr Mccann told him to return to the hospital today to get his lifevest.  Dr Mccann stated that she was not present when patient discharged.      TN called Lucero [facility] who stated that she will contact patient at home with results.  TN notified patient that Lucero will call him and that if he does not receive medicaid he would need to place a $500 deposit and agree to payments.  Patient stated that he was aware and is prepared to do so.  Patient stated that he had not yet called [facility] to schedule an appointment but plans to do so when he returns home.  TN encouraged patient to do so and explained importance of follow up.  Patient stated that he understood and agreed to follow up.

## 2019-12-02 NOTE — PROGRESS NOTES
12/1/19 spoke with Dr Donohue,i on call for Dr Mora, of pt leaving without Lifevest as [facility] unable to fit pt until benefits can be verified on Monday. Pt is currently uninsured with Medicaid pending and if has not been approved for Medicaid on Monday when benefits are checked pt will have to pay out of pocket. Dr Harris reviewed the chart and said he would reach out to Dr Mora. Dr Harris called back stating that they understand that if the patient can't afford it and that Dr Mora said he's not high risk. CM followed up with [facility] and patient as noted in previous CM notes authored by Amanda. Darlene, DEB II, Manager Case Management.

## 2019-12-02 NOTE — DISCHARGE SUMMARY
"[facility]  Discharge Summary      Patient Name: Timo Youssef  MRN: [id number]  Admission Date: 11/26/2019  Hospital Length of Stay: 3 days  Discharge Date and Time:  11/29/2019 9:10 PM  Attending Physician: Josefina att. providers found   Discharging Provider: Margie Mccann MD  Primary Care Provider: Primary Doctor Josefina      HPI:   44 year old male with hypertension, chronic kidney disease and who is a cigar smoker who presented with progressive shortness of breath of 1.5 weeks in evolution. Associated symptoms include cough, chills, lower extremity swelling and chest pain with coughing. Denied fever or sick contacts. 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 He was seen at urgent care today. Had a chest ray obtained and was told to come here for evaluation of "multifocal pneumonia". Only medication he takes at home is verapamil. Used to be on losartan but taken off due to recall. Is allergic to lisinopril.    In the ED, patient was hypertensive. Afebrile and speaking mid sentences but with adequate sats at room air and in no distress. Labwork significant for D-Dimer 0.76, BNP 2792, trop 0.456 and EKG with prolonged QTc, T wave inversion V2/V6. Patient admitted to hospital medicine for possible viral syndrome vs bacterial pneumonia, acute renal failure and Cardiology evaluation.     * No surgery found *      Hospital Course:   45 y/o male admitted with acute systolic and diastolic heart failure.  Echo with EF of 15%. This is a new diagnosis.  Started on IV Lasix.  Uncontrolled BP and sent to ICU on Cardene gtt.  Hydralazine dose increased and started on Coreg.  Able to wean off Cardene gtt.  Patient also with worsening renal failure.  Cardiology recommending life vest. Stepped down to telemetry floor. Breathing comfortably at room air, independent and ambulatory but blood pressure very difficult to control. Antihypertensives " regimen adjusted. Blood pressure finally better controlled on multiple antihypertensives. Patient got very anxious and requested discharge. Renal function remained unpredictable while on diuretics and new antihypertensive therapy therefore recommended against this. Patient also pending insurance. Given lack of insurance, medication regimen was limited to atorvastatin, carvedilol, furosemide and max dose nifedipine. Per [facility] pharmacy, patient is to pay $60 with applied discount. Patient agreed with this. Renal function remained stable at stage 4-5 (average Cr of 3.8) for 3 days. Life vest recommended. Patient did not want to wait in the hospital any longer and was discharged without life vest. Hopefully he allow for fitting at home. Is aware of risk for fatal arrhythmia. Cardiac/renal diet. Activity as tolerated.      Consults:   Consults (From admission, onward)        Status Ordering Provider     Inpatient consult to Cardiology  Once     Provider:  Alber Mora MD    Completed AMANDA POSEY     Inpatient consult to Social Work  Once     Provider:  (Not yet assigned)    CESAR Stuart        Final Active Diagnoses:    Diagnosis Date Noted POA    PRINCIPAL PROBLEM:  Acute combined systolic and diastolic heart failure [I50.41] 11/26/2019 Yes    Acute renal failure [N17.9] 11/26/2019 Yes    Elevated troponin I level [R79.89] 11/26/2019 Yes    Shortness of breath [R06.02] 11/26/2019 Yes    Acute pulmonary edema [J81.0] 11/26/2019 Yes    Aortic root dilatation [I77.810] 11/28/2019 Yes    Chronic kidney disease, stage 3 [N18.3] 11/26/2019 Yes    Tobacco use disorder, severe, dependence [F17.200] 11/26/2019 Yes    Essential hypertension [I10] 11/26/2019 Yes      Problems Resolved During this Admission:    Diagnosis Date Noted Date Resolved POA    Lower extremity edema [R60.0] 11/26/2019 11/27/2019 Yes    Acute viral syndrome [B34.9] 11/26/2019 11/27/2019 Yes    Non-productive cough  [R05] 11/26/2019 11/27/2019 Yes    Chills (without fever) [R68.83] 11/26/2019 11/27/2019 Yes       Discharged Condition: good    Disposition: Home or Self Care    Follow Up:  Follow-up Information      [facility]. Schedule an appointment as soon as possible for a visit in 1 week.    Why:  For cardiology follow-up and to establish primary care   Contact information:  [address]  [place] [address]  [phone]                 Medications:  Reconciled Home Medications:      Medication List      START taking these medications    atorvastatin 10 MG tablet  Commonly known as:  LIPITOR  Take 1 tablet (10 mg total) by mouth every evening.     carvedilol 12.5 MG tablet  Commonly known as:  COREG  Take 1 tablet (12.5 mg total) by mouth 2 (two) times daily.     furosemide 40 MG tablet  Commonly known as:  LASIX  Take 1 tablet (40 mg total) by mouth 2 (two) times daily.     NIFEdipine 30 MG (OSM) 24 hr tablet  Commonly known as:  PROCARDIA-XL  Take 3 tablets (90 mg total) by mouth once daily.        STOP taking these medications    cetirizine 10 MG tablet  Commonly known as:  ZYRTEC     hydrALAZINE 50 MG tablet  Commonly known as:  APRESOLINE     losartan 100 MG tablet  Commonly known as:  COZAAR     verapamil 120 MG CR tablet  Commonly known as:  CALAN-SR            Indwelling Lines/Drains at time of discharge:   Lines/Drains/Airways     None                 Time spent on the discharge of patient: > 35 minutes  Patient was seen and examined on the date of discharge and determined to be suitable for discharge.         Margie Mccann MD  Department of Hospital Medicine  [facility]

## 2019-12-02 NOTE — PATIENT INSTRUCTIONS
"Discharge Instructions for Heart Failure  You have been diagnosed with heart failure. The term "heart failure" sounds scary as it suggests the heart has stopped working. But it actually means the heart is not doing its job as well as it should. Heart failure happens when your heart muscle cannot keep up with your body's need for blood flow. Symptoms of heart failure can be controlled by changes in your lifestyle and by following your doctor's advice.   Home Care  Activity   Ask your doctor about an exercise program. You can benefit from simple activities such as walking or gardening. Exercising most days of the week can make you feel better. Don't be discouraged if your progress is slow at first. Rest as needed and stop activity if you develop symptoms such as chest pain, lightheadedness, or significant shortness of breath. Your doctor may prescribe a cardiac rehabilitation program. This is a program to help recover from heart disease through professional lifestyle counseling and education and medically supervised physical activity.   Diet   Follow a heart healthy diet and work hard to remove salt from your diet. Try to limit total salt intake to 2000 mg a day or less. Salt causes your body to retain water, which can make it harder for your heart to pump. You can limit salt by doing the following:  Limit canned, dried, packaged, and fast foods.  Don't add salt to your food at the table.  Season foods with herbs instead of salt when you cook.  Monitor your fluid intake. Drinking too much fluid can make heart failure worse. It is commonly advised to limit total fluid intake to less than 66 ounces (2 liters) a day.  Limit alcohol. Too much alcohol can be harmful to the heart. Alcohol should be limited to no more than one serving a day for women and two servings a day for men.  Tobacco   Break the smoking habit. Smoking increases your chance of having a heart attack, which will worsen heart failure. Quitting smoking is " the number one thing you can do to improve your health. Enroll in a stop smoking program and ask your doctor about medications or nicotine replacement therapy. These methods improve your chances of success.  Medications   Take your medications exactly as prescribed. Learn the names and purpose of each of your medications. Keep an accurate medication list with you at all times including current dosages. Don't skip doses. If you miss a dose of your medication, take it as soon as you remember, unless it's almost time for your next dose. In that case, just wait and take your next dose at the normal time. Don't take a double dose. If you are unsure, contact your doctor or pharmacist.  Weight monitoring   Weigh yourself every day. Do this at the same time of day and in the same kind of clothes. Ideally, weigh yourself first thing every morning after you empty your bladder, but before you eat breakfast. Record your weight and take a record of it to each of your doctor's visit. If your weight increases by 3 pounds in one day or 5 pounds in one week, you should contact your doctor. This is a sign that you are retaining more fluid than you should be, which can worsen heart failure.  Symptoms   Heart failure can cause a variety of symptoms including the following:  Shortness of breath  Difficulty breathing at night  Swelling in the legs and feet or in the abdomen  Becoming easily fatigued  Irregular or rapid heartbeat  Weakness or lightheadedness  It is important to know what to do if you develop signs of worsening heart failure.  Follow-Up  Make a follow-up appointment as directed by our staff. They will provide specific instruction for timing of appointments. Depending on the type and severity of heart failure you have, you may require follow up as early as 7 days from hospital discharge. Keep appointments for checkups and lab tests that are needed to check your medications and condition.  .    When to Call Your Doctor  Call  your doctor right away if you have any of the following signs of worsening heart failure:  Sudden weight gain (3 or more pounds in one day or 5 or more pounds in one week)  Trouble breathing not related to being active  New or increased swelling of your legs or ankles  Swelling or pain in your abdomen  Breathing trouble at night (waking up short of breath, needing more pillows to breathe)  Frequent coughing that doesnt go away  Feeling much more tired than usual  When to Seek Emergency Medical Attention   Call 911 right away if you develop:  Severe shortness of breath, such that you cannot catch your breath, even resting  Severe chest pain that does not resolve with rest or nitroglycerin  Pink, foamy mucus with cough and shortness of breath  A continuous rapid or irregular heartbeat  Passing out or fainting  Acute stroke symptoms such as sudden numbness or weakness on one side of your face, arm, or leg, or sudden confusion, trouble speaking or vision changes.   © 5529-1676 [employer], [address] [place] [address], [place] [address]. All rights reserved. This information is not intended as a substitute for professional medical care. Always follow your healthcare professional's instructions.

## 2019-12-03 LAB
ENTEROVIRUS: NOT DETECTED
HUMAN BOCAVIRUS: NOT DETECTED
HUMAN CORONAVIRUS, COMMON COLD VIRUS: NOT DETECTED
INFLUENZA A - H1N1-09: NOT DETECTED
PARAINFLUENZA: NOT DETECTED
RVP - ADENOVIRUS: NOT DETECTED
RVP - HUMAN METAPNEUMOVIRUS (HMPV): NOT DETECTED
RVP - INFLUENZA A: NOT DETECTED
RVP - INFLUENZA B: NOT DETECTED
RVP - RESPIRATORY SYNCTIAL VIRUS (RSV) A: NOT DETECTED
RVP - RESPIRATORY VIRAL PANEL, SOURCE: NORMAL
RVP - RHINOVIRUS: NOT DETECTED

## 2019-12-03 NOTE — PROGRESS NOTES
Spoke with Lucero the Nurse CM for [facility] ([phone]) states Mr Youssef has been approved for Lifevest. A PSR from [employer] will contact this patient for Lifevest fitting today. DEB Colon II, Manager Case Management.

## 2020-03-02 PROBLEM — J81.0 ACUTE PULMONARY EDEMA: Status: RESOLVED | Noted: 2019-11-26 | Resolved: 2020-03-02

## 2022-01-11 NOTE — NURSING TRANSFER
Nursing Transfer Note      11/27/2019     Transfer To: [address]    Transfer via wheelchair    Transfer with cardiac monitoring    Transported by RN and transport     Medicines sent: NO     Chart send with patient: Yes    Notified: Per patient will notify    Patient reassessed at: 11/27/19 0605    Upon arrival to floor: patient oriented to room, call bell in reach and bed in lowest position   Statement Selected

## 2024-09-04 ENCOUNTER — TELEPHONE (OUTPATIENT)
Dept: NEPHROLOGY | Facility: CLINIC | Age: 49
End: 2024-09-04
Payer: COMMERCIAL

## 2024-09-05 ENCOUNTER — TELEPHONE (OUTPATIENT)
Dept: NEPHROLOGY | Facility: CLINIC | Age: 49
End: 2024-09-05
Payer: COMMERCIAL

## 2024-09-05 NOTE — TELEPHONE ENCOUNTER
Try calling the pt several times no answer and his mailbox is full. I also called his gf she stated they don't that they no longer together.

## 2024-09-06 DIAGNOSIS — N18.5 CKD (CHRONIC KIDNEY DISEASE) STAGE 5, GFR LESS THAN 15 ML/MIN: Primary | ICD-10-CM
